# Patient Record
Sex: MALE | Race: OTHER | HISPANIC OR LATINO | URBAN - METROPOLITAN AREA
[De-identification: names, ages, dates, MRNs, and addresses within clinical notes are randomized per-mention and may not be internally consistent; named-entity substitution may affect disease eponyms.]

---

## 2018-04-08 ENCOUNTER — INPATIENT (INPATIENT)
Facility: HOSPITAL | Age: 63
LOS: 1 days | Discharge: HOME | End: 2018-04-10
Attending: INTERNAL MEDICINE

## 2018-04-08 VITALS
HEART RATE: 78 BPM | SYSTOLIC BLOOD PRESSURE: 190 MMHG | TEMPERATURE: 98 F | RESPIRATION RATE: 17 BRPM | OXYGEN SATURATION: 95 % | DIASTOLIC BLOOD PRESSURE: 102 MMHG

## 2018-04-08 LAB
ALBUMIN SERPL ELPH-MCNC: 3.8 G/DL — SIGNIFICANT CHANGE UP (ref 3.5–5.2)
ALP SERPL-CCNC: 127 U/L — HIGH (ref 30–115)
ALT FLD-CCNC: 77 U/L — HIGH (ref 0–41)
ANION GAP SERPL CALC-SCNC: 16 MMOL/L — HIGH (ref 7–14)
APPEARANCE UR: (no result)
APTT BLD: 27.3 SEC — SIGNIFICANT CHANGE UP (ref 27–39.2)
AST SERPL-CCNC: 60 U/L — HIGH (ref 0–41)
BACTERIA # UR AUTO: (no result) /HPF
BASOPHILS # BLD AUTO: 0.08 K/UL — SIGNIFICANT CHANGE UP (ref 0–0.2)
BASOPHILS NFR BLD AUTO: 0.4 % — SIGNIFICANT CHANGE UP (ref 0–1)
BILIRUB SERPL-MCNC: 0.9 MG/DL — SIGNIFICANT CHANGE UP (ref 0.2–1.2)
BILIRUB UR-MCNC: NEGATIVE — SIGNIFICANT CHANGE UP
BUN SERPL-MCNC: 26 MG/DL — HIGH (ref 10–20)
CALCIUM SERPL-MCNC: 9 MG/DL — SIGNIFICANT CHANGE UP (ref 8.5–10.1)
CHLORIDE SERPL-SCNC: 97 MMOL/L — LOW (ref 98–110)
CO2 SERPL-SCNC: 23 MMOL/L — SIGNIFICANT CHANGE UP (ref 17–32)
COLOR SPEC: YELLOW — SIGNIFICANT CHANGE UP
CREAT SERPL-MCNC: 1.8 MG/DL — HIGH (ref 0.7–1.5)
DIFF PNL FLD: (no result)
EOSINOPHIL # BLD AUTO: 0.15 K/UL — SIGNIFICANT CHANGE UP (ref 0–0.7)
EOSINOPHIL NFR BLD AUTO: 0.8 % — SIGNIFICANT CHANGE UP (ref 0–8)
EPI CELLS # UR: (no result) /HPF
GLUCOSE SERPL-MCNC: 105 MG/DL — HIGH (ref 70–99)
GLUCOSE UR QL: NEGATIVE MG/DL — SIGNIFICANT CHANGE UP
HCT VFR BLD CALC: 43.4 % — SIGNIFICANT CHANGE UP (ref 42–52)
HGB BLD-MCNC: 14.5 G/DL — SIGNIFICANT CHANGE UP (ref 14–18)
IMM GRANULOCYTES NFR BLD AUTO: 0.9 % — HIGH (ref 0.1–0.3)
INR BLD: 1.09 RATIO — SIGNIFICANT CHANGE UP (ref 0.65–1.3)
KETONES UR-MCNC: NEGATIVE — SIGNIFICANT CHANGE UP
LACTATE SERPL-SCNC: 1.2 MMOL/L — SIGNIFICANT CHANGE UP (ref 0.5–2.2)
LEUKOCYTE ESTERASE UR-ACNC: (no result)
LIDOCAIN IGE QN: 34 U/L — SIGNIFICANT CHANGE UP (ref 7–60)
LYMPHOCYTES # BLD AUTO: 14.3 % — LOW (ref 20.5–51.1)
LYMPHOCYTES # BLD AUTO: 2.73 K/UL — SIGNIFICANT CHANGE UP (ref 1.2–3.4)
MAGNESIUM SERPL-MCNC: 2.3 MG/DL — SIGNIFICANT CHANGE UP (ref 1.8–2.4)
MCHC RBC-ENTMCNC: 29.2 PG — SIGNIFICANT CHANGE UP (ref 27–31)
MCHC RBC-ENTMCNC: 33.4 G/DL — SIGNIFICANT CHANGE UP (ref 32–37)
MCV RBC AUTO: 87.5 FL — SIGNIFICANT CHANGE UP (ref 80–94)
MONOCYTES # BLD AUTO: 1.79 K/UL — HIGH (ref 0.1–0.6)
MONOCYTES NFR BLD AUTO: 9.3 % — SIGNIFICANT CHANGE UP (ref 1.7–9.3)
NEUTROPHILS # BLD AUTO: 14.23 K/UL — HIGH (ref 1.4–6.5)
NEUTROPHILS NFR BLD AUTO: 74.3 % — SIGNIFICANT CHANGE UP (ref 42.2–75.2)
NITRITE UR-MCNC: NEGATIVE — SIGNIFICANT CHANGE UP
NRBC # BLD: 0 /100 WBCS — SIGNIFICANT CHANGE UP (ref 0–0)
PH UR: 6 — SIGNIFICANT CHANGE UP (ref 5–8)
PLATELET # BLD AUTO: 267 K/UL — SIGNIFICANT CHANGE UP (ref 130–400)
POTASSIUM SERPL-MCNC: 4.2 MMOL/L — SIGNIFICANT CHANGE UP (ref 3.5–5)
POTASSIUM SERPL-SCNC: 4.2 MMOL/L — SIGNIFICANT CHANGE UP (ref 3.5–5)
PROT SERPL-MCNC: 7.6 G/DL — SIGNIFICANT CHANGE UP (ref 6–8)
PROT UR-MCNC: 30 MG/DL
PROTHROM AB SERPL-ACNC: 11.8 SEC — SIGNIFICANT CHANGE UP (ref 9.95–12.87)
RBC # BLD: 4.96 M/UL — SIGNIFICANT CHANGE UP (ref 4.7–6.1)
RBC # FLD: 13.6 % — SIGNIFICANT CHANGE UP (ref 11.5–14.5)
RBC CASTS # UR COMP ASSIST: (no result) /HPF
SODIUM SERPL-SCNC: 136 MMOL/L — SIGNIFICANT CHANGE UP (ref 135–146)
SP GR SPEC: 1.02 — SIGNIFICANT CHANGE UP (ref 1.01–1.03)
UROBILINOGEN FLD QL: 1 MG/DL (ref 0.2–0.2)
WBC # BLD: 19.15 K/UL — HIGH (ref 4.8–10.8)
WBC # FLD AUTO: 19.15 K/UL — HIGH (ref 4.8–10.8)
WBC UR QL: >50 /HPF

## 2018-04-08 RX ORDER — DIATRIZOATE MEGLUMINE 180 MG/ML
20 INJECTION, SOLUTION INTRAVESICAL ONCE
Qty: 0 | Refills: 0 | Status: COMPLETED | OUTPATIENT
Start: 2018-04-08 | End: 2018-04-08

## 2018-04-08 RX ORDER — SODIUM CHLORIDE 9 MG/ML
1000 INJECTION INTRAMUSCULAR; INTRAVENOUS; SUBCUTANEOUS ONCE
Qty: 0 | Refills: 0 | Status: COMPLETED | OUTPATIENT
Start: 2018-04-08 | End: 2018-04-08

## 2018-04-08 RX ADMIN — SODIUM CHLORIDE 1000 MILLILITER(S): 9 INJECTION INTRAMUSCULAR; INTRAVENOUS; SUBCUTANEOUS at 22:11

## 2018-04-08 RX ADMIN — DIATRIZOATE MEGLUMINE 30 MILLILITER(S): 180 INJECTION, SOLUTION INTRAVESICAL at 22:31

## 2018-04-08 NOTE — ED ADULT NURSE NOTE - OBJECTIVE STATEMENT
pt states he began having abdominal pain on Thursday. went to PMD and was given a laxative. had one large BM but pt is still reporting intermittent severe abdominal pain. occasional nausea, no vomiting. reports diaphoresis

## 2018-04-09 DIAGNOSIS — Z98.890 OTHER SPECIFIED POSTPROCEDURAL STATES: Chronic | ICD-10-CM

## 2018-04-09 DIAGNOSIS — Z95.5 PRESENCE OF CORONARY ANGIOPLASTY IMPLANT AND GRAFT: Chronic | ICD-10-CM

## 2018-04-09 DIAGNOSIS — N20.0 CALCULUS OF KIDNEY: ICD-10-CM

## 2018-04-09 DIAGNOSIS — N13.2 HYDRONEPHROSIS WITH RENAL AND URETERAL CALCULOUS OBSTRUCTION: ICD-10-CM

## 2018-04-09 LAB
ALBUMIN SERPL ELPH-MCNC: 3.1 G/DL — LOW (ref 3.5–5.2)
ALP SERPL-CCNC: 104 U/L — SIGNIFICANT CHANGE UP (ref 30–115)
ALT FLD-CCNC: 59 U/L — HIGH (ref 0–41)
ANION GAP SERPL CALC-SCNC: 14 MMOL/L — SIGNIFICANT CHANGE UP (ref 7–14)
AST SERPL-CCNC: 36 U/L — SIGNIFICANT CHANGE UP (ref 0–41)
BASOPHILS # BLD AUTO: 0.08 K/UL — SIGNIFICANT CHANGE UP (ref 0–0.2)
BASOPHILS NFR BLD AUTO: 0.6 % — SIGNIFICANT CHANGE UP (ref 0–1)
BILIRUB DIRECT SERPL-MCNC: 0.2 MG/DL — SIGNIFICANT CHANGE UP (ref 0–0.2)
BILIRUB INDIRECT FLD-MCNC: 0.6 MG/DL — SIGNIFICANT CHANGE UP (ref 0.2–1.2)
BILIRUB SERPL-MCNC: 0.8 MG/DL — SIGNIFICANT CHANGE UP (ref 0.2–1.2)
BUN SERPL-MCNC: 21 MG/DL — HIGH (ref 10–20)
CALCIUM SERPL-MCNC: 7.9 MG/DL — LOW (ref 8.5–10.1)
CHLORIDE SERPL-SCNC: 103 MMOL/L — SIGNIFICANT CHANGE UP (ref 98–110)
CO2 SERPL-SCNC: 23 MMOL/L — SIGNIFICANT CHANGE UP (ref 17–32)
CREAT SERPL-MCNC: 1.5 MG/DL — SIGNIFICANT CHANGE UP (ref 0.7–1.5)
EOSINOPHIL # BLD AUTO: 0.26 K/UL — SIGNIFICANT CHANGE UP (ref 0–0.7)
EOSINOPHIL NFR BLD AUTO: 1.9 % — SIGNIFICANT CHANGE UP (ref 0–8)
GLUCOSE SERPL-MCNC: 106 MG/DL — HIGH (ref 70–99)
HCT VFR BLD CALC: 36.7 % — LOW (ref 42–52)
HGB BLD-MCNC: 12.3 G/DL — LOW (ref 14–18)
IMM GRANULOCYTES NFR BLD AUTO: 0.7 % — HIGH (ref 0.1–0.3)
LYMPHOCYTES # BLD AUTO: 22.4 % — SIGNIFICANT CHANGE UP (ref 20.5–51.1)
LYMPHOCYTES # BLD AUTO: 3.07 K/UL — SIGNIFICANT CHANGE UP (ref 1.2–3.4)
MCHC RBC-ENTMCNC: 29.6 PG — SIGNIFICANT CHANGE UP (ref 27–31)
MCHC RBC-ENTMCNC: 33.5 G/DL — SIGNIFICANT CHANGE UP (ref 32–37)
MCV RBC AUTO: 88.2 FL — SIGNIFICANT CHANGE UP (ref 80–94)
MONOCYTES # BLD AUTO: 1.4 K/UL — HIGH (ref 0.1–0.6)
MONOCYTES NFR BLD AUTO: 10.2 % — HIGH (ref 1.7–9.3)
NEUTROPHILS # BLD AUTO: 8.83 K/UL — HIGH (ref 1.4–6.5)
NEUTROPHILS NFR BLD AUTO: 64.2 % — SIGNIFICANT CHANGE UP (ref 42.2–75.2)
PLATELET # BLD AUTO: 212 K/UL — SIGNIFICANT CHANGE UP (ref 130–400)
POTASSIUM SERPL-MCNC: 3.9 MMOL/L — SIGNIFICANT CHANGE UP (ref 3.5–5)
POTASSIUM SERPL-SCNC: 3.9 MMOL/L — SIGNIFICANT CHANGE UP (ref 3.5–5)
PROT SERPL-MCNC: 6.3 G/DL — SIGNIFICANT CHANGE UP (ref 6–8)
RBC # BLD: 4.16 M/UL — LOW (ref 4.7–6.1)
RBC # FLD: 13.5 % — SIGNIFICANT CHANGE UP (ref 11.5–14.5)
SODIUM SERPL-SCNC: 140 MMOL/L — SIGNIFICANT CHANGE UP (ref 135–146)
WBC # BLD: 13.73 K/UL — HIGH (ref 4.8–10.8)
WBC # FLD AUTO: 13.73 K/UL — HIGH (ref 4.8–10.8)

## 2018-04-09 RX ORDER — TAMSULOSIN HYDROCHLORIDE 0.4 MG/1
0.4 CAPSULE ORAL AT BEDTIME
Qty: 0 | Refills: 0 | Status: DISCONTINUED | OUTPATIENT
Start: 2018-04-09 | End: 2018-04-10

## 2018-04-09 RX ORDER — CEFTRIAXONE 500 MG/1
1 INJECTION, POWDER, FOR SOLUTION INTRAMUSCULAR; INTRAVENOUS ONCE
Qty: 0 | Refills: 0 | Status: COMPLETED | OUTPATIENT
Start: 2018-04-09 | End: 2018-04-09

## 2018-04-09 RX ORDER — SODIUM CHLORIDE 9 MG/ML
1000 INJECTION INTRAMUSCULAR; INTRAVENOUS; SUBCUTANEOUS
Qty: 0 | Refills: 0 | Status: DISCONTINUED | OUTPATIENT
Start: 2018-04-09 | End: 2018-04-10

## 2018-04-09 RX ORDER — ASPIRIN/CALCIUM CARB/MAGNESIUM 324 MG
325 TABLET ORAL DAILY
Qty: 0 | Refills: 0 | Status: DISCONTINUED | OUTPATIENT
Start: 2018-04-09 | End: 2018-04-10

## 2018-04-09 RX ORDER — ATORVASTATIN CALCIUM 80 MG/1
1 TABLET, FILM COATED ORAL
Qty: 0 | Refills: 0 | COMMUNITY

## 2018-04-09 RX ORDER — NIFEDIPINE 30 MG
60 TABLET, EXTENDED RELEASE 24 HR ORAL DAILY
Qty: 0 | Refills: 0 | Status: DISCONTINUED | OUTPATIENT
Start: 2018-04-09 | End: 2018-04-10

## 2018-04-09 RX ORDER — ATORVASTATIN CALCIUM 80 MG/1
20 TABLET, FILM COATED ORAL AT BEDTIME
Qty: 0 | Refills: 0 | Status: DISCONTINUED | OUTPATIENT
Start: 2018-04-09 | End: 2018-04-10

## 2018-04-09 RX ORDER — INFLUENZA VIRUS VACCINE 15; 15; 15; 15 UG/.5ML; UG/.5ML; UG/.5ML; UG/.5ML
0.5 SUSPENSION INTRAMUSCULAR ONCE
Qty: 0 | Refills: 0 | Status: DISCONTINUED | OUTPATIENT
Start: 2018-04-09 | End: 2018-04-10

## 2018-04-09 RX ORDER — SODIUM CHLORIDE 9 MG/ML
1000 INJECTION INTRAMUSCULAR; INTRAVENOUS; SUBCUTANEOUS ONCE
Qty: 0 | Refills: 0 | Status: COMPLETED | OUTPATIENT
Start: 2018-04-09 | End: 2018-04-09

## 2018-04-09 RX ORDER — ACETAMINOPHEN 500 MG
650 TABLET ORAL EVERY 6 HOURS
Qty: 0 | Refills: 0 | Status: DISCONTINUED | OUTPATIENT
Start: 2018-04-09 | End: 2018-04-10

## 2018-04-09 RX ORDER — ASPIRIN/CALCIUM CARB/MAGNESIUM 324 MG
1 TABLET ORAL
Qty: 0 | Refills: 0 | COMMUNITY

## 2018-04-09 RX ORDER — MORPHINE SULFATE 50 MG/1
1 CAPSULE, EXTENDED RELEASE ORAL EVERY 6 HOURS
Qty: 0 | Refills: 0 | Status: DISCONTINUED | OUTPATIENT
Start: 2018-04-09 | End: 2018-04-10

## 2018-04-09 RX ORDER — CEFTRIAXONE 500 MG/1
1 INJECTION, POWDER, FOR SOLUTION INTRAMUSCULAR; INTRAVENOUS EVERY 24 HOURS
Qty: 0 | Refills: 0 | Status: DISCONTINUED | OUTPATIENT
Start: 2018-04-09 | End: 2018-04-09

## 2018-04-09 RX ORDER — KETOROLAC TROMETHAMINE 30 MG/ML
15 SYRINGE (ML) INJECTION ONCE
Qty: 0 | Refills: 0 | Status: DISCONTINUED | OUTPATIENT
Start: 2018-04-09 | End: 2018-04-09

## 2018-04-09 RX ORDER — TAMSULOSIN HYDROCHLORIDE 0.4 MG/1
0.4 CAPSULE ORAL ONCE
Qty: 0 | Refills: 0 | Status: COMPLETED | OUTPATIENT
Start: 2018-04-09 | End: 2018-04-09

## 2018-04-09 RX ORDER — METOPROLOL TARTRATE 50 MG
1 TABLET ORAL
Qty: 0 | Refills: 0 | COMMUNITY

## 2018-04-09 RX ORDER — NIFEDIPINE 30 MG
1 TABLET, EXTENDED RELEASE 24 HR ORAL
Qty: 0 | Refills: 0 | COMMUNITY

## 2018-04-09 RX ORDER — MORPHINE SULFATE 50 MG/1
2 CAPSULE, EXTENDED RELEASE ORAL EVERY 4 HOURS
Qty: 0 | Refills: 0 | Status: DISCONTINUED | OUTPATIENT
Start: 2018-04-09 | End: 2018-04-10

## 2018-04-09 RX ORDER — CEFTRIAXONE 500 MG/1
2 INJECTION, POWDER, FOR SOLUTION INTRAMUSCULAR; INTRAVENOUS EVERY 24 HOURS
Qty: 0 | Refills: 0 | Status: DISCONTINUED | OUTPATIENT
Start: 2018-04-09 | End: 2018-04-10

## 2018-04-09 RX ORDER — ASPIRIN/CALCIUM CARB/MAGNESIUM 324 MG
81 TABLET ORAL DAILY
Qty: 0 | Refills: 0 | Status: DISCONTINUED | OUTPATIENT
Start: 2018-04-09 | End: 2018-04-09

## 2018-04-09 RX ORDER — HEPARIN SODIUM 5000 [USP'U]/ML
5000 INJECTION INTRAVENOUS; SUBCUTANEOUS EVERY 8 HOURS
Qty: 0 | Refills: 0 | Status: DISCONTINUED | OUTPATIENT
Start: 2018-04-09 | End: 2018-04-10

## 2018-04-09 RX ORDER — METOPROLOL TARTRATE 50 MG
50 TABLET ORAL DAILY
Qty: 0 | Refills: 0 | Status: DISCONTINUED | OUTPATIENT
Start: 2018-04-09 | End: 2018-04-10

## 2018-04-09 RX ADMIN — HEPARIN SODIUM 5000 UNIT(S): 5000 INJECTION INTRAVENOUS; SUBCUTANEOUS at 14:29

## 2018-04-09 RX ADMIN — CEFTRIAXONE 100 GRAM(S): 500 INJECTION, POWDER, FOR SOLUTION INTRAMUSCULAR; INTRAVENOUS at 02:27

## 2018-04-09 RX ADMIN — TAMSULOSIN HYDROCHLORIDE 0.4 MILLIGRAM(S): 0.4 CAPSULE ORAL at 21:28

## 2018-04-09 RX ADMIN — Medication 50 MILLIGRAM(S): at 05:34

## 2018-04-09 RX ADMIN — Medication 60 MILLIGRAM(S): at 10:59

## 2018-04-09 RX ADMIN — SODIUM CHLORIDE 100 MILLILITER(S): 9 INJECTION INTRAMUSCULAR; INTRAVENOUS; SUBCUTANEOUS at 21:27

## 2018-04-09 RX ADMIN — Medication 325 MILLIGRAM(S): at 14:28

## 2018-04-09 RX ADMIN — SODIUM CHLORIDE 100 MILLILITER(S): 9 INJECTION INTRAMUSCULAR; INTRAVENOUS; SUBCUTANEOUS at 05:35

## 2018-04-09 RX ADMIN — Medication 60 MILLIGRAM(S): at 05:34

## 2018-04-09 RX ADMIN — CEFTRIAXONE 100 GRAM(S): 500 INJECTION, POWDER, FOR SOLUTION INTRAMUSCULAR; INTRAVENOUS at 14:29

## 2018-04-09 RX ADMIN — HEPARIN SODIUM 5000 UNIT(S): 5000 INJECTION INTRAVENOUS; SUBCUTANEOUS at 21:28

## 2018-04-09 RX ADMIN — TAMSULOSIN HYDROCHLORIDE 0.4 MILLIGRAM(S): 0.4 CAPSULE ORAL at 01:25

## 2018-04-09 RX ADMIN — Medication 15 MILLIGRAM(S): at 02:27

## 2018-04-09 RX ADMIN — SODIUM CHLORIDE 1000 MILLILITER(S): 9 INJECTION INTRAMUSCULAR; INTRAVENOUS; SUBCUTANEOUS at 02:27

## 2018-04-09 RX ADMIN — ATORVASTATIN CALCIUM 20 MILLIGRAM(S): 80 TABLET, FILM COATED ORAL at 21:28

## 2018-04-09 NOTE — H&P ADULT - HISTORY OF PRESENT ILLNESS
62y old  Male w/ pmhx of nephrolithiasis s/p lithotripsy, CAD s/p PCI and stent (13 years ago), who presents with a chief complaint of abdominal pain radiating to left lower quadrant which began on thursday.  Pt saw PMD and was treated for constipation.  Pt symptoms persisted after bowel mvmt and now associated with nausea and chills. Pt denies fever or dysuria. 62y old  Male w/ pmhx of nephrolithiasis s/p lithotripsy, CAD s/p PCI and stent (13 years ago), HTN, DLD presents with worsening L sided pelvic pain X 4 days. Pt reports that pain began in the left CVA area, then moved to the left pelvic area.  Pain was described as a shooting pain, comes and goes, exacerbated by laying on the left side. Pt reports subjective fever, chills, nausea and 3-4 episodes of non bloody non bilious vomiting. Also, pt admits to stop taking his medications because pt could not tolerate po intake. Pt saw PMD and was treated for constipation.  Pt's symptoms persisted after bowel mvmt and decided to come to the hospital. No dysuria, frequency, or hematuria.    In ED, pt is s/p rocephin 1g, IVF NS 2 L bolus, toradol 15mg. 62y old  Male w/ pmhx of nephrolithiasis s/p lithotripsy, CAD s/p PCI and stent (13 years ago), HTN, DLD presents with worsening L sided pelvic pain X 4 days. Pt reports that pain began in the left CVA area, then moved to the left pelvic area.  Pain was described as a shooting pain, comes and goes, exacerbated by laying on the left side. Pt reports subjective fever, chills, nausea and 3-4 episodes of non bloody non bilious vomiting. Also, pt stopped taking his medications 2/2 intolerance to PO intake. Pt saw PMD and was treated for constipation.  Pt's symptoms persisted after bowel mvmt and decided to come to the hospital. Mild pain on urination. No hematuria.    In ED, pt is s/p rocephin 1g, IVF NS 2 L bolus, toradol 15mg.

## 2018-04-09 NOTE — H&P ADULT - NSHPPHYSICALEXAM_GEN_ALL_CORE
GENERAL: NAD, well-developed. non toxic appearing  HEAD:  Atraumatic, Normocephalic  EYES: EOMI, PERRLA, conjunctiva and sclera clear  NECK: Supple, No JVD  CHEST/LUNG: Clear to auscultation bilaterally; No wheeze  HEART: Regular rate and rhythm; No murmurs, rubs, or gallops  ABDOMEN: Soft, + mild left pelvic tenderness. Nondistended; Bowel sounds present. No guarding  EXTREMITIES:  no edema  NEUROLOGY: non-focal, AAOx3

## 2018-04-09 NOTE — CONSULT NOTE ADULT - ATTENDING COMMENTS
Patient with 4mm left UVJ stone as seen on CT scan -- HU about 200  KUB wont show such a soft stone  sonogram shows possible residual left hydronpehrosis, bilateral ureteral jets  patient states that pain has improved but remains  plan to reassess in AM to determine if need for left ureteral stent by Dr Tineo

## 2018-04-09 NOTE — ED PROVIDER NOTE - PLAN OF CARE
62m w LLQ abdominal pain/tender, nontoxic appearing, n/v intact. --CBC, CMP, coags, lipase, UA, CT abd. --IV fluids, analgesia as needed, observe/re-assess.

## 2018-04-09 NOTE — CONSULT NOTE ADULT - SUBJECTIVE AND OBJECTIVE BOX
Patient is a 62y old  Male who presents with a chief complaint of abdominal pain radiating to left lower quadrant which began on thursday.  Pt saw PMD and was treated for constipation.  Pt symptoms persisted after bowel mvmt and now associated with nausea and chills. Pt denies fever or dysuria.           PAST MEDICAL & SURGICAL HISTORY:  Hyperlipemia  HTN (hypertension)  CAD  MI  Cardiac stent  Achilles tendon repair  Nephrolithiasis      REVIEW OF SYSTEMS:    CONSTITUTIONAL: + chills  HEENT: No visual changes  ENDO: No sweating  NECK: No pain or stiffness  MUSCULOSKELETAL: No back pain, no joint pain  RESPIRATORY: No shortness of breath  CARDIOVASCULAR: No chest pain  GASTROINTESTINAL: No abdominal or epigastric pain. No nausea, vomiting,  No diarrhea or constipation.   NEUROLOGICAL: No mental status changes  PSYCH: No depression, no mood changes  SKIN: No itching      MEDICATIONS  (STANDING):  cefTRIAXone   IVPB 1 Gram(s) IV Intermittent Once  ketorolac   Injectable 15 milliGRAM(s) IV Push Once  sodium chloride 0.9% Bolus 1000 milliLiter(s) IV Bolus once    MEDICATIONS  (PRN):      Allergies    Allergy Status Unknown    Intolerances        SOCIAL HISTORY: No illicit drug use    FAMILY HISTORY:      Vital Signs Last 24 Hrs  T(C): 36.4 (2018 20:22), Max: 36.4 (2018 20:22)  T(F): 97.6 (2018 20:22), Max: 97.6 (2018 20:22)  HR: 78 (2018 20:22) (78 - 78)  BP: 190/102 (2018 20:22) (190/102 - 190/102)  BP(mean): --  RR: 17 (2018 20:22) (17 - 17)  SpO2: 95% (2018 20:22) (95% - 95%)    PHYSICAL EXAM:    Constitutional: NAD, well-developed  HEENT: EOMI  Neck: no pain  Back: Mild Left sided CVA tenderness  Respiratory: No accessory respiratory muscle use  Abd: Soft, NT/ND  no organomegally  no hernia  : no testicular tenderness, no supraubic tenderness  Extremities: no edema  Neurological: A/O x 3  Psychiatric: Normal mood, normal affect  Skin: No rashes    I&O's Summary      LABS:                        14.5   19.15 )-----------( 267      ( 2018 22:10 )             43.4     04-08    136  |  97<L>  |  26<H>  ----------------------------<  105<H>  4.2   |  23  |  1.8<H>    Ca    9.0      2018 22:10  Mg     2.3         TPro  7.6  /  Alb  3.8  /  TBili  0.9  /  DBili  x   /  AST  60<H>  /  ALT  77<H>  /  AlkPhos  127<H>      PT/INR - ( 2018 22:10 )   PT: 11.80 sec;   INR: 1.09 ratio         PTT - ( 2018 22:10 )  PTT:27.3 sec  Urinalysis Basic - ( 2018 22:10 )    Color: Yellow / Appearance: Cloudy / S.020 / pH: x  Gluc: x / Ketone: Negative  / Bili: Negative / Urobili: 1.0 mg/dL   Blood: x / Protein: 30 mg/dL / Nitrite: Negative   Leuk Esterase: Moderate / RBC: 10-25 /HPF / WBC >50 /HPF   Sq Epi: x / Non Sq Epi: Few /HPF / Bacteria: Moderate /HPF      Urine Culture:         RADIOLOGY & ADDITIONAL STUDIES:  < from: CT Abdomen and Pelvis w/ Oral Cont (18 @ 00:59) >    IMPRESSION:        Mild left hydroureteronephrosis extending to an obstructing calculus at   the UVJ measuring 4 x 4 x 3 mm.    < end of copied text >

## 2018-04-09 NOTE — H&P ADULT - NSHPREVIEWOFSYSTEMS_GEN_ALL_CORE
Review of Systems:   CONSTITUTIONAL: + subjective fever, chills, loss of appetite  EYES: No eye pain, visual disturbances, or discharge  ENMT:  No difficulty hearing, tinnitus, vertigo; No sinus or throat pain  NECK: No pain or stiffness  RESPIRATORY: No cough, wheezing, chills or hemoptysis; No shortness of breath  CARDIOVASCULAR: No chest pain, palpitations, dizziness, or leg swelling  GASTROINTESTINAL: + L pelvic area, +N/V. no diarrhea.   GENITOURINARY: No dysuria, frequency, hematuria, or incontinence  NEUROLOGICAL: No headaches Review of Systems:   CONSTITUTIONAL: + subjective fever, chills, loss of appetite  EYES: No eye pain, visual disturbances, or discharge  ENMT:  No difficulty hearing, tinnitus, vertigo; No sinus or throat pain  NECK: No pain or stiffness  RESPIRATORY: No cough, wheezing, chills or hemoptysis; No shortness of breath  CARDIOVASCULAR: No chest pain, palpitations, dizziness, or leg swelling  GASTROINTESTINAL: + L pelvic area, +N/V. no diarrhea.   GENITOURINARY: mild pain on urination. No hematuria  NEUROLOGICAL: No headaches

## 2018-04-09 NOTE — ED PROVIDER NOTE - PMH
HTN (hypertension)    Hyperlipemia Coronary artery disease    HTN (hypertension)    Hyperlipemia    Nephrolithiasis  s/p lithotripsy  Stented coronary artery  stent placement 13 years ago

## 2018-04-09 NOTE — H&P ADULT - ASSESSMENT
63 yo M with PMHx of HTN, DLD, CAD s/p stent, nephrolithiasis s/p lithrotripsy presents with left sided pelvic pain a/w subjective fever, chills, N/V for 4 days.    # UTI and obstructive left nephrolithiasis   - c/w Urine culture and blood culture  - f/u urology  - c/w Flomax 0.4mg qD  - pain control: morphine 2mg q6 prn for severe pain, morphine 1mg q6 prn for moderate pain  - c/w  cc/ hr  - advance diet as tolerated    # KAE 2/2 obstructive nephrolithiasis   - c/w IVF     # HTN - BP elevated likely 2/2 pt not taking his medications and pain  - resume home meds ( nifedipine XL 60mg )  - pain control  - monitor BP    # DLD  - lipitor 20mg     # CAD s/p stent  - ASA, Toprol, lipitor     # dvt ppx: heparin subq  - no need for GI ppx for now 61 yo M with PMHx of HTN, DLD, CAD s/p stent, nephrolithiasis s/p lithrotripsy presents with left sided pelvic pain a/w subjective fever, chills, N/V for 4 days.    # UTI and obstructive left nephrolithiasis   - c/w Urine culture and blood culture  - IV Rocephin 1G qD  - f/u urology  - c/w Flomax 0.4mg qD  - pain control: morphine 2mg q6 prn for severe pain, morphine 1mg q6 prn for moderate pain  - c/w  cc/ hr  - advance diet as tolerated    # KAE 2/2 obstructive nephrolithiasis   - c/w IVF     # HTN - BP elevated likely 2/2 pt not taking his medications and pain  - resume home meds ( nifedipine XL 60mg )  - pain control  - monitor BP    # DLD  - lipitor 20mg     # CAD s/p stent  - ASA, Toprol, lipitor     # dvt ppx: heparin subq  - no need for GI ppx for now 63 yo M with PMHx of HTN, DLD, CAD s/p stent, nephrolithiasis s/p lithrotripsy presents with left sided pelvic pain a/w subjective fever, chills, N/V for 4 days.    # Pyelonephritis and obstructive left nephrolithiasis   - c/w Urine culture and blood culture  - IV Rocephin 2 gram qD  - f/u urology  - consider ID consult  - c/w Flomax 0.4mg qD  - pain control: morphine 2mg q6 prn for severe pain, morphine 1mg q6 prn for moderate pain  - c/w  cc/ hr  - advance diet as tolerated    # KAE likely 2/2 obstructive nephrolithiasis   - c/w IVF     # mild transaminitis   - monitor CMP    # HTN - BP elevated likely 2/2 pt not taking medications and pain  - resume home meds ( nifedipine XL 60mg )  - pain control  - monitor BP    # DLD  - lipitor 20mg     # CAD s/p stent  - ASA, Toprol, lipitor     # dvt ppx: heparin subq  - no need for GI ppx for now 63 yo M with PMHx of HTN, DLD, CAD s/p stent, nephrolithiasis s/p lithrotripsy presents with left sided pelvic pain a/w subjective fever, chills, N/V for 4 days.    # Pyelonephritis and left obstructing calculus at UVJ    - c/w Urine culture and blood culture  - IV Rocephin 2 gram qD  - f/u urology : if pt does not improve, need med/cardio clearance for cystoscopy and stent.   - consider ID consult  - c/w Flomax 0.4mg qD  - pain control: morphine 2mg q6 prn for severe pain, morphine 1mg q6 prn for moderate pain  - c/w  cc/ hr  - advance diet as tolerated    # KAE likely 2/2 obstructive ureteral stone  - c/w IVF   - Cr in 02/2006: 1.1    # mild transaminitis   - monitor CMP    # HTN - BP elevated likely 2/2 pt not taking medications and pain  - resume home meds ( nifedipine XL 60mg )  - pain control  - monitor BP    # DLD  - lipitor 20mg     # CAD s/p stent  - ASA, Toprol, lipitor     # dvt ppx: heparin subq  - no need for GI ppx for now 61 yo M with PMHx of HTN, DLD, CAD s/p stent, nephrolithiasis s/p lithrotripsy presents with left sided pelvic pain a/w subjective fever, chills, N/V for 4 days.    # Pyelonephritis and left obstructing calculus at UVJ    - c/w Urine culture and blood culture  - IV Rocephin 2 gram qD  - f/u urology : if pt does not improve, need med/cardio clearance for cystoscopy and stent.   - consider ID consult  - c/w Flomax 0.4mg qD  - pain control: morphine 2mg q6 prn for severe pain, morphine 1mg q6 prn for moderate pain  - c/w  cc/ hr  - advance diet as tolerated    # KAE likely 2/2 obstructive ureteral stone  - c/w IVF   - Cr in 02/2006: 1.1    # mild transaminitis   - monitor CMP    # HTN - BP elevated likely 2/2 pt not taking medications and pain  - resume home meds ( nifedipine XL 60mg )  - pain control  - monitor BP    # DLD  - lipitor 20mg     # CAD s/p stent  - ASA 325mg, Toprol, lipitor     # dvt ppx: heparin subq  - no need for GI ppx for now

## 2018-04-09 NOTE — H&P ADULT - ATTENDING COMMENTS
Patient is seen and examined independently, I agree with resident note above and plan of care -edited and corrected where applicable- with addition:    PAST MEDICAL & SURGICAL HISTORY:  Coronary artery disease  Nephrolithiasis: s/p lithotripsy  Stented coronary artery: stent placement 13 years ago  Hyperlipemia  HTN (hypertension)  H/O heart artery stent  H/O Achilles tendon repair: Right side      Vitals:  T(F): 97 (18 @ 09:46)  HR: 83 (18 @ 09:46)  BP: 175/91 (18 @ 09:46)  RR: 18 (18 @ 08:03)  SpO2: 96% (18 @ 08:03)    TESTS & MEASUREMENTS:                        12.3   13.73 )-----------( 212      ( 2018 06:32 )             36.7     PT/INR - ( 2018 22:10 )   PT: 11.80 sec;   INR: 1.09 ratio         PTT - ( 2018 22:10 )  PTT:27.3 sec      140  |  103  |  21<H>  ----------------------------<  106<H>  3.9   |  23  |  1.5    Ca    7.9<L>      2018 06:32  Mg     2.3         TPro  6.3  /  Alb  3.1<L>  /  TBili  0.8  /  DBili  0.2  /  AST  36  /  ALT  59<H>  /  AlkPhos  104  09    LIVER FUNCTIONS - ( 2018 06:32 )  Alb: 3.1 g/dL / Pro: 6.3 g/dL / ALK PHOS: 104 U/L / ALT: 59 U/L / AST: 36 U/L / GGT: x           Urinalysis Basic - ( 2018 22:10 )    Color: Yellow / Appearance: Cloudy / S.020 / pH: x  Gluc: x / Ketone: Negative  / Bili: Negative / Urobili: 1.0 mg/dL   Blood: x / Protein: 30 mg/dL / Nitrite: Negative   Leuk Esterase: Moderate / RBC: 10-25 /HPF / WBC >50 /HPF   Sq Epi: x / Non Sq Epi: Few /HPF / Bacteria: Moderate /HPF      In summary:  HEALTH ISSUES - PROBLEM Dx:  Hydronephrosis with urinary obstruction due to ureteral calculus: Hydronephrosis with urinary obstruction due to ureteral calculus  Nephrolithiasis: Nephrolithiasis Patient is seen and examined independently, I agree with resident note above and plan of care -edited and corrected where applicable- with addition:    PAST MEDICAL & SURGICAL HISTORY:  Coronary artery disease  Nephrolithiasis: s/p lithotripsy  Stented coronary artery: stent placement 13 years ago  Hyperlipemia  HTN (hypertension)  H/O heart artery stent  H/O Achilles tendon repair: Right side      Vitals:  T(F): 97 (18 @ 09:46)  HR: 83 (18 @ 09:46)  BP: 175/91 (18 @ 09:46)  RR: 18 (18 @ 08:03)  SpO2: 96% (18 @ 08:03)    TESTS & MEASUREMENTS:                        12.3   13.73 )-----------( 212      ( 2018 06:32 )             36.7     PT/INR - ( 2018 22:10 )   PT: 11.80 sec;   INR: 1.09 ratio         PTT - ( 2018 22:10 )  PTT:27.3 sec      140  |  103  |  21<H>  ----------------------------<  106<H>  3.9   |  23  |  1.5    Ca    7.9<L>      2018 06:32  Mg     2.3         TPro  6.3  /  Alb  3.1<L>  /  TBili  0.8  /  DBili  0.2  /  AST  36  /  ALT  59<H>  /  AlkPhos  104  09    LIVER FUNCTIONS - ( 2018 06:32 )  Alb: 3.1 g/dL / Pro: 6.3 g/dL / ALK PHOS: 104 U/L / ALT: 59 U/L / AST: 36 U/L / GGT: x           Urinalysis Basic - ( 2018 22:10 )    Color: Yellow / Appearance: Cloudy / S.020 / pH: x  Gluc: x / Ketone: Negative  / Bili: Negative / Urobili: 1.0 mg/dL   Blood: x / Protein: 30 mg/dL / Nitrite: Negative   Leuk Esterase: Moderate / RBC: 10-25 /HPF / WBC >50 /HPF   Sq Epi: x / Non Sq Epi: Few /HPF / Bacteria: Moderate /HPF      In summary:  HEALTH ISSUES - PROBLEM Dx:  Hydronephrosis with urinary obstruction due to ureteral calculus: being closely f/u by urology team.  Recovering KAE, obstructive uropathy  Pyelonephritis on BS IV Abx    Plan:  . BS IV Abx until UCx results available  . IVF/Flomax/Analgesics/ Urology F/U possible need for JJ/Cystoscopy if no improvement  . I/O BUN/Crea

## 2018-04-09 NOTE — CONSULT NOTE ADULT - PROBLEM SELECTOR RECOMMENDATION 9
Admit to medical service, cardiac evaluation, IV hydration, IV antibiotics, Toradol for pain and Flomax. Strain all urine. If pt does not improve may need cystoscopy with with ureteral stent. Will need medical and/or cardiology clearance prior to any procedure. Dr Lowry aware and agrees with above plan

## 2018-04-09 NOTE — H&P ADULT - FAMILY HISTORY
Father  Still living? Unknown  Family history of nephrolithiasis, Age at diagnosis: Age Unknown  Family history of coronary artery disease, Age at diagnosis: Age Unknown

## 2018-04-09 NOTE — PROGRESS NOTE ADULT - SUBJECTIVE AND OBJECTIVE BOX
Unable to take pt to OR 4/9 as add-on case.   Pain has been controlled throughout the day.   No fever. Minimal CVAT.  WBC 13 from 20, Cr 1.5 from 1.8.  May feed patient tonight. NPO after midnight for possible add-on tomorrow 4/10 for stent placement if warranted. Will get KUB & R/B sono.    D/w  attending.

## 2018-04-09 NOTE — ED PROVIDER NOTE - PROGRESS NOTE DETAILS
urology consult aware. patient comfortable at this time case d/w Urology and recommending admit to medicine, IV fluids, abx, & toradol. case d/w Dr Monica MENDEZ & Dr Baeza for admit

## 2018-04-09 NOTE — H&P ADULT - NSHPLABSRESULTS_GEN_ALL_CORE
T(C): 36.4 (18 @ 20:22), Max: 36.4 (18 @ 20:22)  T(F): 97.6 (18 @ 20:22), Max: 97.6 (18 @ 20:22)  HR: 78 (18 @ 20:22) (78 - 78)  BP: 190/102 (18 @ 20:22) (190/102 - 190/102)  RR: 17 (18 @ 20:22) (17 - 17)  SpO2: 95% (18 @ 20:22) (95% - 95%)  Labs:                               14.5   19.15<H>   )-----------(   267      ( 2018 22:10 )                    43.4     Neutro%  74.3    Lympho%  14.3<L>   Mono%    9.3     Bands    x            136  |  97<L>  |  26<H>  ----------------------------<  105<H>  4.2   |  23  |  1.8<H>    Ca    9.0      2018 22:10  Mg     2.3         TPro  7.6  /  Alb  3.8  /  TBili  0.9  /  DBili  x   /  AST  60<H>  /  ALT  77<H>  /  AlkPhos  127<H>      eGFR if Non African American: 39 mL/min/1.73M2 (18 @ 22:10)  eGFR if African American: 45 mL/min/1.73M2 (18 @ 22:10)      ( 2018 22:10 )   PT: 11.80 sec;   INR: 1.09 ratio;       PTT:27.3 sec    Urinalysis Basic - ( 2018 22:10 )    Color: Yellow / Appearance: Cloudy / S.020 / pH: x  Gluc: x / Ketone: Negative  / Bili: Negative / Urobili: 1.0 mg/dL   Blood: x / Protein: 30 mg/dL / Nitrite: Negative   Leuk Esterase: Moderate / RBC: 10-25 /HPF / WBC >50 /HPF   Sq Epi: x / Non Sq Epi: Few /HPF / Bacteria: Moderate /HPF

## 2018-04-09 NOTE — ED PROVIDER NOTE - NS ED ROS FT
Review of Systems  Constitutional:  No fever or chills.  Eyes:  Negative.  ENMT:  No nasal congestion, discharge, or throat pain.   Cardiac:  No chest pain, syncope, or edema.  Respiratory:  No dyspnea, wheezing, or cough. No hemoptysis.  GI:  No vomiting, diarrhea, melena, or hematochezia. +abdominal pain  :  No dysuria or hematuria. No testicle pain/swelling, no penile discharge.  Musculoskeletal:  No joint swelling, joint pain, or back pain.  Skin:  No skin rash, pruritis, jaundice, or lesions.  Neuro:  No headache, loss of sensation, or focal weakness.  No change in mental status.

## 2018-04-09 NOTE — ED PROVIDER NOTE - PHYSICAL EXAMINATION
Physical Exam  General: Awake, alert, NAD, WDWN, non-toxic appearing, NCAT  Eyes: PERRL, EOMI, no icterus, lids and conjunctivae are normal  ENT: External inspection normal, pink/moist membranes  CV: S1S2, regular rate and rhythm, no murmur/gallops/rubs, no JVD, 2+ pulses b/l, no edema/cords/homans, warm/well-perfused  Respiratory: Normal respiratory rate/effort, no respiratory distress, normal voice, speaking full sentences, lungs clear to auscultation b/l, no wheezing/rales/rhonchi, no retractions, no stridor  Abdomen: Soft abdomen, LLQ tender, no distended/guarding/rebound, no CVA tender  Muskuloskeletal: FROM all 4 extremities, N/V intact  Neck: FROM neck, supple, no meningismus, trachea midline, no JVD  Integumentary: Color normal for race, warm and dry, no rash  Neuro: Oriented x3, CN 2-12 grossly intact, normal motor, normal sensory  Psych: Oriented x3, mood normal, affect normal Physical Exam  General: Awake, alert, NAD, WDWN, non-toxic appearing, NCAT  Eyes: PERRL, EOMI, no icterus, lids and conjunctivae are normal  ENT: External inspection normal, pink/moist membranes  CV: S1S2, regular rate and rhythm, no murmur/gallops/rubs, no JVD, 2+ pulses b/l, no edema/cords/homans, warm/well-perfused  Respiratory: Normal respiratory rate/effort, no respiratory distress, normal voice, speaking full sentences, lungs clear to auscultation b/l, no wheezing/rales/rhonchi, no retractions, no stridor  Abdomen: Soft abdomen, LLQ tender, no distended/guarding/rebound, no CVA tender  Musculoskeletal: FROM all 4 extremities, N/V intact  Neck: FROM neck, supple, no meningismus, trachea midline, no JVD  Integumentary: Color normal for race, warm and dry, no rash  Neuro: Oriented x3, CN 2-12 grossly intact, normal motor, normal sensory  Psych: Oriented x3, mood normal, affect normal

## 2018-04-09 NOTE — ED PROVIDER NOTE - MEDICAL DECISION MAKING DETAILS
62m w LLQ abdominal pain/tender and found to have ureteral stone, UTI, & KAE. Case d/w Urology. Abx, IV fluids, & analgesia given. Patient admitted for further care and management.

## 2018-04-09 NOTE — ED PROVIDER NOTE - CARE PLAN
Principal Discharge DX:	Renal colic on left side  Secondary Diagnosis:	UTI (urinary tract infection)  Secondary Diagnosis:	Acute kidney injury Principal Discharge DX:	Renal colic on left side  Assessment and plan of treatment:	62m w LLQ abdominal pain/tender, nontoxic appearing, n/v intact. --CBC, CMP, coags, lipase, UA, CT abd. --IV fluids, analgesia as needed, observe/re-assess.  Secondary Diagnosis:	UTI (urinary tract infection)  Secondary Diagnosis:	Acute kidney injury

## 2018-04-09 NOTE — ED PROVIDER NOTE - OBJECTIVE STATEMENT
62m w HTN, HLD, CAD, & prior kidney stones presents w LLQ abdominal pain since Friday. Pain is sharp, moderate, constant, no radiating, no exacerbating/alleviating. Pt reports feeling constipated since Fri as well but normal flatus. +Vomiting x3 (no blood/bile). No prior abdominal surgery.

## 2018-04-09 NOTE — H&P ADULT - PMH
HTN (hypertension)    Hyperlipemia    Nephrolithiasis  s/p lithotripsy  Stented coronary artery  stent placement 13 years ago

## 2018-04-10 ENCOUNTER — TRANSCRIPTION ENCOUNTER (OUTPATIENT)
Age: 63
End: 2018-04-10

## 2018-04-10 VITALS
SYSTOLIC BLOOD PRESSURE: 148 MMHG | DIASTOLIC BLOOD PRESSURE: 72 MMHG | RESPIRATION RATE: 18 BRPM | HEART RATE: 77 BPM | TEMPERATURE: 98 F

## 2018-04-10 LAB
ANION GAP SERPL CALC-SCNC: 14 MMOL/L — SIGNIFICANT CHANGE UP (ref 7–14)
BUN SERPL-MCNC: 17 MG/DL — SIGNIFICANT CHANGE UP (ref 10–20)
CALCIUM SERPL-MCNC: 8.2 MG/DL — LOW (ref 8.5–10.1)
CHLORIDE SERPL-SCNC: 103 MMOL/L — SIGNIFICANT CHANGE UP (ref 98–110)
CO2 SERPL-SCNC: 25 MMOL/L — SIGNIFICANT CHANGE UP (ref 17–32)
CREAT SERPL-MCNC: 1.3 MG/DL — SIGNIFICANT CHANGE UP (ref 0.7–1.5)
CULTURE RESULTS: NO GROWTH — SIGNIFICANT CHANGE UP
GLUCOSE SERPL-MCNC: 108 MG/DL — HIGH (ref 70–99)
HCT VFR BLD CALC: 36.2 % — LOW (ref 42–52)
HGB BLD-MCNC: 12 G/DL — LOW (ref 14–18)
MAGNESIUM SERPL-MCNC: 1.7 MG/DL — LOW (ref 1.8–2.4)
MCHC RBC-ENTMCNC: 29.2 PG — SIGNIFICANT CHANGE UP (ref 27–31)
MCHC RBC-ENTMCNC: 33.1 G/DL — SIGNIFICANT CHANGE UP (ref 32–37)
MCV RBC AUTO: 88.1 FL — SIGNIFICANT CHANGE UP (ref 80–94)
NRBC # BLD: 0 /100 WBCS — SIGNIFICANT CHANGE UP (ref 0–0)
PLATELET # BLD AUTO: 229 K/UL — SIGNIFICANT CHANGE UP (ref 130–400)
POTASSIUM SERPL-MCNC: 4 MMOL/L — SIGNIFICANT CHANGE UP (ref 3.5–5)
POTASSIUM SERPL-SCNC: 4 MMOL/L — SIGNIFICANT CHANGE UP (ref 3.5–5)
RBC # BLD: 4.11 M/UL — LOW (ref 4.7–6.1)
RBC # FLD: 13.5 % — SIGNIFICANT CHANGE UP (ref 11.5–14.5)
SODIUM SERPL-SCNC: 142 MMOL/L — SIGNIFICANT CHANGE UP (ref 135–146)
SPECIMEN SOURCE: SIGNIFICANT CHANGE UP
WBC # BLD: 13.15 K/UL — HIGH (ref 4.8–10.8)
WBC # FLD AUTO: 13.15 K/UL — HIGH (ref 4.8–10.8)

## 2018-04-10 RX ORDER — TAMSULOSIN HYDROCHLORIDE 0.4 MG/1
1 CAPSULE ORAL
Qty: 30 | Refills: 0 | OUTPATIENT
Start: 2018-04-10

## 2018-04-10 RX ORDER — CEFUROXIME AXETIL 250 MG
1 TABLET ORAL
Qty: 14 | Refills: 0 | OUTPATIENT
Start: 2018-04-10

## 2018-04-10 RX ADMIN — Medication 50 MILLIGRAM(S): at 05:34

## 2018-04-10 RX ADMIN — Medication 60 MILLIGRAM(S): at 05:34

## 2018-04-10 RX ADMIN — HEPARIN SODIUM 5000 UNIT(S): 5000 INJECTION INTRAVENOUS; SUBCUTANEOUS at 05:34

## 2018-04-10 RX ADMIN — Medication 325 MILLIGRAM(S): at 11:11

## 2018-04-10 RX ADMIN — CEFTRIAXONE 100 GRAM(S): 500 INJECTION, POWDER, FOR SOLUTION INTRAMUSCULAR; INTRAVENOUS at 13:47

## 2018-04-10 NOTE — PROGRESS NOTE ADULT - ATTENDING COMMENTS
pt seen examined and films reviewed he is comfortable with bilateral jets and it is likely the stone passed but he was not straining his urine. he mckenzie lgo home, strain for a few days and then  follow up . we will see if a workup is needed then

## 2018-04-10 NOTE — PROGRESS NOTE ADULT - SUBJECTIVE AND OBJECTIVE BOX
63 yo male with left uvj calculus  no events noted over night  pt 63 yo male with left uvj calculus  no events noted over night  pt denies pain or LUTS, no n/v/f/c    pt seen and examined at bedside    Vital Signs Last 24 Hrs  T(C): 36.9 (10 Apr 2018 07:34), Max: 37.1 (09 Apr 2018 15:56)  T(F): 98.5 (10 Apr 2018 07:34), Max: 98.7 (09 Apr 2018 15:56)  HR: 77 (10 Apr 2018 07:34) (77 - 85)  BP: 148/72 (10 Apr 2018 07:34) (136/65 - 169/85)  RR: 18 (10 Apr 2018 07:34) (16 - 18)  SpO2: 96% (10 Apr 2018 04:55) (96% - 96%)    renal/bladder sonogram - no hydronephrosis, no stone seen at uvj/bladder  kub- prelim- no visible stone    abd - soft, nd, nttp, no guarding, no rebound tenderness  gu- no clayton, pt voiding on his own, no cvat b/l                          12.0   13.15 )-----------( 229      ( 10 Apr 2018 07:00 )             36.2   04-10    142  |  103  |  17  ----------------------------<  108<H>  4.0   |  25  |  1.3    Ca    8.2<L>      10 Apr 2018 07:00  Mg     1.7     04-10    TPro  6.3  /  Alb  3.1<L>  /  TBili  0.8  /  DBili  0.2  /  AST  36  /  ALT  59<H>  /  AlkPhos  104  04-09   Culture - Urine (04.09.18 @ 01:20)    Specimen Source: .Urine Clean Catch (Midstream)    Culture Results:   No growth

## 2018-04-10 NOTE — DISCHARGE NOTE ADULT - PATIENT PORTAL LINK FT
You can access the [facility], offered by [facility], by registering with the following website: [email or web address]

## 2018-04-10 NOTE — PROGRESS NOTE ADULT - SUBJECTIVE AND OBJECTIVE BOX
ACE RYAN  62y, Male  Allergy: Allergy Status Unknown    Hospital Day: 1d    Patient seen and examined earlier today.     PMH/PSH:  PAST MEDICAL & SURGICAL HISTORY:  Coronary artery disease  Nephrolithiasis: s/p lithotripsy  Stented coronary artery: stent placement 13 years ago  Hyperlipemia  HTN (hypertension)  H/O heart artery stent  H/O Achilles tendon repair: Right side      LAST 24-Hr EVENTS:  Possible need for stent placement today by urology.     VITALS:  T(F): 98.5 (04-10-18 @ 07:34), Max: 98.7 (18 @ 15:56)  HR: 77 (04-10-18 @ 07:34)  BP: 148/72 (04-10-18 @ 07:34) (136/65 - 175/91)  RR: 18 (04-10-18 @ 07:34)  SpO2: 96% (04-10-18 @ 04:55)    TESTS & MEASUREMENTS:  Weight (Kg): 93.9 (18 @ 09:55)    18 @ 07:01  -  04-10-18 @ 07:00  --------------------------------------------------------  IN: 0 mL / OUT: 500 mL / NET: -500 mL                      12.0   13.15 )-----------( 229      ( 10 Apr 2018 07:00 )             36.2     PT/INR - ( 2018 22:10 )   PT: 11.80 sec;   INR: 1.09 ratio         PTT - ( 2018 22:10 )  PTT:27.3 sec  04-10    142  |  103  |  17  ----------------------------<  108<H>  4.0   |  25  |  1.3    Ca    8.2<L>      10 Apr 2018 07:00  Mg     1.7     04-10    TPro  6.3  /  Alb  3.1<L>  /  TBili  0.8  /  DBili  0.2  /  AST  36  /  ALT  59<H>  /  AlkPhos  104  04-09    LIVER FUNCTIONS - ( 2018 06:32 )  Alb: 3.1 g/dL / Pro: 6.3 g/dL / ALK PHOS: 104 U/L / ALT: 59 U/L / AST: 36 U/L / GGT: x             Culture - Urine (collected 18 @ 01:20)  Source: .Urine Clean Catch (Midstream)  Final Report (04-10-18 @ 08:46):    No growth      Urinalysis Basic - ( 2018 22:10 )    Color: Yellow / Appearance: Cloudy / S.020 / pH: x  Gluc: x / Ketone: Negative  / Bili: Negative / Urobili: 1.0 mg/dL   Blood: x / Protein: 30 mg/dL / Nitrite: Negative   Leuk Esterase: Moderate / RBC: 10-25 /HPF / WBC >50 /HPF   Sq Epi: x / Non Sq Epi: Few /HPF / Bacteria: Moderate /HPF      RADIOLOGY & ADDITIONAL TESTS:    < from: US Kidney and Bladder (18 @ 21:44) >  Bilateral ureteral jets are visualized.    No calculus visualized.         RECENT DIAGNOSTIC ORDERS:    Xray Kidney Ureter Bladder: Urgent   Indication: L UVJ stone  Transport: Portable  Exam Completed  Provider's Contact #: [phone] (18 @ 18:28)  Diet, NPO:   Except Medications (04-10-18 @ 05:21)      MEDICATIONS:    MEDICATIONS  (STANDING):  aspirin 325 milliGRAM(s) Oral daily  atorvastatin 20 milliGRAM(s) Oral at bedtime  cefTRIAXone   IVPB 2 Gram(s) IV Intermittent every 24 hours  heparin  Injectable 5000 Unit(s) SubCutaneous every 8 hours  influenza   Vaccine 0.5 milliLiter(s) IntraMuscular once  metoprolol succinate ER 50 milliGRAM(s) Oral daily  NIFEdipine XL 60 milliGRAM(s) Oral daily  NIFEdipine XL 60 milliGRAM(s) Oral daily  sodium chloride 0.9%. 1000 milliLiter(s) (100 mL/Hr) IV Continuous <Continuous>  tamsulosin 0.4 milliGRAM(s) Oral at bedtime    MEDICATIONS  (PRN):  acetaminophen   Tablet 650 milliGRAM(s) Oral every 6 hours PRN fever or mild pain  morphine  - Injectable 2 milliGRAM(s) IV Push every 4 hours PRN sevee pain  morphine  - Injectable 1 milliGRAM(s) IV Push every 6 hours PRN Moderate Pain (4 - 6)          PHYSICAL EXAM:  GENERAL: A&O x3,  in NAD/P,   NECK: No Swelling, No JVD.   CHEST/LUNG: Good air entry, No crackles, No rhonchi, No wheezing.  HEART: RRR, No murmurs.  ABDOMEN: Soft, Nontender, Nondistended; Bowel sounds present.  EXTREMITIES:  Peripheral Pulses Present , No clubbing, No edema

## 2018-04-10 NOTE — DISCHARGE NOTE ADULT - HOSPITAL COURSE
62yM w/ hxNephrolithiasis s/p lithotripsy, CAD s/p PCI and stent (13 years ago), HTN, DLD p/w worsening L sided pelvic pain, n/v, fever, chills X 4 days & inability to tolerate PO PTP. CTAbdPwIVCon showed mild left hydroureteronephrosis extending to an obstructing calculus at the UVJ measuring 4 x 4 x 3 mm. Patient's symptoms resolved with IV pain control, ceftriaxone, and IVF. No cystoscopy necessary. Patient medically stable for discharge with outpatient follow-up in 1-2 weeks.

## 2018-04-10 NOTE — DISCHARGE NOTE ADULT - PLAN OF CARE
Symptomatic treatment, medical management, and evaluation for surgical intervention You were found to have a left-sided nephrolithiasis and received supportive medical therapy with resolution of your symptoms. You were evaluated by the urology service and cleared for discharge. Take your medications as prescribed. Follow-up with your PMD and urologist in 1-2 weeks.

## 2018-04-10 NOTE — PROGRESS NOTE ADULT - ASSESSMENT
1.	Obstructive Uropathy/ Hydronephrosis/ Calculus s/p urology eval  2.	High risk for ascending UTI/Pyelo    Plan:  1. KUB: No opacity; patient likely passed stone  2. From urology POV, may d/c patient on Flomax and Abx until f/u with urology as outpatient  3. D/W RN and PGY1 at bedside
63 yo male with likely passed left uvj stone  pt afebrile, wbc and cr improving, ucx no growth (pt may have been given abx prior to ucx being obtained)    Plan  -d/c pt home on 5 day course of PO Ceftin  -strain urine  -op Urology f/u with

## 2018-04-10 NOTE — DISCHARGE NOTE ADULT - CARE PLAN
Principal Discharge DX:	Nephrolithiasis  Goal:	Symptomatic treatment, medical management, and evaluation for surgical intervention  Assessment and plan of treatment:	You were found to have a left-sided nephrolithiasis and received supportive medical therapy with resolution of your symptoms. You were evaluated by the urology service and cleared for discharge. Take your medications as prescribed. Follow-up with your PMD and urologist in 1-2 weeks.

## 2018-04-10 NOTE — DISCHARGE NOTE ADULT - MEDICATION SUMMARY - MEDICATIONS TO TAKE
I will START or STAY ON the medications listed below when I get home from the hospital:    aspirin 325 mg oral tablet  -- 1 tab(s) by mouth once a day  -- Indication: For Coronary artery disease    tamsulosin 0.4 mg oral capsule  -- 1 cap(s) by mouth once a day (at bedtime)  -- Indication: For Hydronephrosis with urinary obstruction due to ureteral calculus    Lipitor 20 mg oral tablet  -- 1 tab(s) by mouth once a day (at bedtime)  -- Indication: For Hyperlipemia    Toprol-XL 50 mg oral tablet, extended release  -- 1 tab(s) by mouth once a day  -- Indication: For HTN (hypertension)    Nifedical XL 60 mg oral tablet, extended release  -- 1 tab(s) by mouth once a day  -- Indication: For HTN (hypertension)    cefuroxime 500 mg oral tablet  -- 1 tab(s) by mouth 2 times a day   -- Finish all this medication unless otherwise directed by prescriber.  Medication should be taken with plenty of water.  Take with food or milk.    -- Indication: For RENAL COLIC ON LEFT SIDE;UTI(URINARY TRACT INFECTION);ACUTE KIDNEY INJURY

## 2018-04-10 NOTE — DISCHARGE NOTE ADULT - CARE PROVIDER_API CALL
Ton Dyer (MD; PhD), Cardiovascular Disease; Interventional Cardiology  [facility] [address] [place] [address]  [address] [place] [address]  Phone: [phone]  Fax: [phone]    Gala Tineo (MD), Urology  [address] [place] [address]  Phone: [phone]  Fax: [phone]

## 2018-04-12 DIAGNOSIS — E78.5 HYPERLIPIDEMIA, UNSPECIFIED: ICD-10-CM

## 2018-04-12 DIAGNOSIS — Z95.5 PRESENCE OF CORONARY ANGIOPLASTY IMPLANT AND GRAFT: ICD-10-CM

## 2018-04-12 DIAGNOSIS — N13.6 PYONEPHROSIS: ICD-10-CM

## 2018-04-12 DIAGNOSIS — Z87.442 PERSONAL HISTORY OF URINARY CALCULI: ICD-10-CM

## 2018-04-12 DIAGNOSIS — N17.9 ACUTE KIDNEY FAILURE, UNSPECIFIED: ICD-10-CM

## 2018-04-12 DIAGNOSIS — I10 ESSENTIAL (PRIMARY) HYPERTENSION: ICD-10-CM

## 2018-04-12 DIAGNOSIS — I25.10 ATHEROSCLEROTIC HEART DISEASE OF NATIVE CORONARY ARTERY WITHOUT ANGINA PECTORIS: ICD-10-CM

## 2018-04-14 LAB
CULTURE RESULTS: SIGNIFICANT CHANGE UP
SPECIMEN SOURCE: SIGNIFICANT CHANGE UP

## 2019-04-08 PROBLEM — E78.5 HYPERLIPIDEMIA, UNSPECIFIED: Chronic | Status: ACTIVE | Noted: 2018-04-08

## 2019-04-08 PROBLEM — Z95.5 PRESENCE OF CORONARY ANGIOPLASTY IMPLANT AND GRAFT: Chronic | Status: ACTIVE | Noted: 2018-04-09

## 2019-04-08 PROBLEM — I10 ESSENTIAL (PRIMARY) HYPERTENSION: Chronic | Status: ACTIVE | Noted: 2018-04-08

## 2019-04-08 PROBLEM — I25.10 ATHEROSCLEROTIC HEART DISEASE OF NATIVE CORONARY ARTERY WITHOUT ANGINA PECTORIS: Chronic | Status: ACTIVE | Noted: 2018-04-09

## 2019-04-08 PROBLEM — N20.0 CALCULUS OF KIDNEY: Chronic | Status: ACTIVE | Noted: 2018-04-09

## 2019-06-26 ENCOUNTER — APPOINTMENT (OUTPATIENT)
Dept: CARDIOLOGY | Facility: CLINIC | Age: 64
End: 2019-06-26

## 2020-08-29 ENCOUNTER — EMERGENCY (EMERGENCY)
Facility: HOSPITAL | Age: 65
LOS: 0 days | Discharge: HOME | End: 2020-08-29
Attending: STUDENT IN AN ORGANIZED HEALTH CARE EDUCATION/TRAINING PROGRAM | Admitting: STUDENT IN AN ORGANIZED HEALTH CARE EDUCATION/TRAINING PROGRAM
Payer: MEDICARE

## 2020-08-29 VITALS
DIASTOLIC BLOOD PRESSURE: 72 MMHG | RESPIRATION RATE: 18 BRPM | TEMPERATURE: 99 F | SYSTOLIC BLOOD PRESSURE: 137 MMHG | OXYGEN SATURATION: 97 % | HEART RATE: 83 BPM
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
## 2020-08-29 DIAGNOSIS — I10 ESSENTIAL (PRIMARY) HYPERTENSION: ICD-10-CM

## 2020-08-29 DIAGNOSIS — S92.501A DISPLACED UNSPECIFIED FRACTURE OF RIGHT LESSER TOE(S), INITIAL ENCOUNTER FOR CLOSED FRACTURE: ICD-10-CM

## 2020-08-29 DIAGNOSIS — Z98.890 OTHER SPECIFIED POSTPROCEDURAL STATES: Chronic | ICD-10-CM

## 2020-08-29 DIAGNOSIS — Z95.5 PRESENCE OF CORONARY ANGIOPLASTY IMPLANT AND GRAFT: Chronic | ICD-10-CM

## 2020-08-29 DIAGNOSIS — M79.676 PAIN IN UNSPECIFIED TOE(S): ICD-10-CM
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
## 2020-08-29 DIAGNOSIS — Z79.899 OTHER LONG TERM (CURRENT) DRUG THERAPY: ICD-10-CM
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
## 2020-08-29 PROCEDURE — 73630 X-RAY EXAM OF FOOT: CPT | Mod: 26,RT

## 2020-08-29 PROCEDURE — 28510 TREATMENT OF TOE FRACTURE: CPT | Mod: 54

## 2020-08-29 PROCEDURE — 99284 EMERGENCY DEPT VISIT MOD MDM: CPT | Mod: 57

## 2020-08-29 PROCEDURE — 29550 STRAPPING OF TOES: CPT | Mod: T5

## 2020-08-29 NOTE — ED PROVIDER NOTE - PHYSICAL EXAMINATION
VITAL SIGNS: I have reviewed nursing notes and confirm.  CONSTITUTIONAL: Well-developed; well-nourished; in no acute distress.  SKIN: Skin exam is warm and dry, no acute rash.  HEAD: Normocephalic; atraumatic.  EYES: PERRL, EOM intact; conjunctiva and sclera clear.  ENT: No nasal discharge; airway clear.   EXT: Normal ROM. No edema. +Tenderness to distal 3rd phalanx of right foot.   LYMPH: No acute cervical adenopathy.  NEURO: Alert, oriented. Grossly unremarkable. No focal deficits.  PSYCH: Cooperative, appropriate.

## 2020-08-29 NOTE — ED PROVIDER NOTE - NSFOLLOWUPINSTRUCTIONS_ED_ALL_ED_FT
Toe Fracture    A toe fracture is a break in one of the toe bones (phalanges). A toe fracture may be:  A crack in the surface of the bone (stress fracture). This often occurs in athletes.A break all the way through the bone (complete fracture).What are the causes?  This condition may be caused by:  Direct impact, such as from dropping a heavy object on your toe.Stubbing your toe.Twisting or stretching your toe out of place.Overuse or repetitive exercise.What increases the risk?  You are more likely to develop this condition if you:  Play contact sports.Have a condition that causes the bones to become thin and brittle (osteoporosis).Have a low calcium level.What are the signs or symptoms?  The main symptoms of this condition are swelling and pain in the toe. Other symptoms include:  Bruising.Stiffness.Numbness.A change in the way the toe looks.Broken bones that poke through the skin.Blood beneath the toenail.How is this diagnosed?  This condition is diagnosed with a physical exam. You may also have X-rays.  How is this treated?  Treatment for this condition depends on the type of fracture and its severity. Treatment may include:  Taping the broken toe to a toe that is next to it (tatiana taping). This is the most common treatment for fractures in which the bone has not moved out of place (non-displaced fracture).Wearing a shoe that has a wide, rigid sole to protect the toe and to limit its movement.Wearing a walking cast.Having a procedure to move the toe back into place.Surgery. This may be needed if the:  Pieces of broken bone are out of place (displaced).Bone breaks through the skin.Physical therapy. This is done to help regain movement and strength in the toe.You may need follow-up X-rays to make sure that the bone is healing well and staying in position.  Follow these instructions at home:  If you have a shoe:     Wear the shoe as told by your health care provider. Remove it only as told by your health care provider. Loosen the shoe if your toes tingle, become numb, or turn cold and blue.Keep the shoe clean and dry.If you have a cast:     Do not put pressure on any part of the cast until it is fully hardened. This may take several hours.Do not stick anything inside the cast to scratch your skin. Doing that increases your risk of infection.Check the skin around the cast every day. Tell your health care provider about any concerns. You may put lotion on dry skin around the edges of the cast. Do not put lotion on the skin underneath the cast. Keep the cast clean and dry.Bathing     Do not take baths, swim, or use a hot tub until your health care provider approves. Ask your health care provider if you can take showers.If the shoe or cast is not waterproof:  Do not let it get wet. Cover it with a watertight covering when you take a bath or a shower.Activity     Do not use the injured foot to support your body weight until your health care provider says that you can. Use crutches as directed.Ask your health care provider:  What activities are safe for you during recovery.What activities you need to avoid.Do physical therapy exercises as directed.Driving     Do not drive or use heavy machinery while taking pain medicine.Do not drive while wearing a cast on a foot that you use for driving.Managing pain, stiffness, and swelling        If directed, put ice on painful areas:  Put ice in a plastic bag.Place a towel between your skin and the bag.  If you have a shoe, remove it as told by your health care provider.If you have a cast, place a towel between your cast and the bag.Leave the ice on for 20 minutes, 2–3 times per day.Raise (elevate) the injured area above the level of your heart while you are sitting or lying down.General instructions     If your toe was treated with tatiana taping, follow your health care provider's instructions for changing the gauze and tape. Change it more often if:  The gauze and tape get wet. If this happens, dry the space between the toes.The gauze and tape are too tight and cause your toe to become pale or numb.If you were not given a protective shoe, wear sturdy, supportive shoes. Your shoes should not pinch your toes and should not fit tightly against your toes.Do not use any products that contain nicotine or tobacco, such as cigarettes and e-cigarettes. These can delay bone healing. If you need help quitting, ask your health care provider.Take over-the-counter and prescription medicines only as told by your health care provider.Keep all follow-up visits as told by your health care provider. This is important.Contact a health care provider if you have:  Pain that gets worse or does not get better with medicine.A fever.A bad smell coming from your cast.Get help right away if you have:  Any of the following in your toes or your foot:  Numbness that gets worse.Tingling.Coldness.Blue skin.Redness or swelling that gets worse.Pain that suddenly becomes severe.Summary  A toe fracture is a break in one of the toe bones (phalanges).Treatment depends on how severe your fracture is and how the pieces of the broken bone line up with each other. Treatment may include tatiana taping, wearing a shoe or a cast, or using crutches.Ice and elevate your foot to help lessen the pain and swelling.This information is not intended to replace advice given to you by your health care provider. Make sure you discuss any questions you have with your health care provider.

## 2020-08-29 NOTE — ED ADULT NURSE NOTE - PMH
Coronary artery disease    HTN (hypertension)    Hyperlipemia    Nephrolithiasis  s/p lithotripsy  Stented coronary artery  stent placement 13 years ago

## 2020-08-29 NOTE — ED PROVIDER NOTE - NSFOLLOWUPCLINICS_GEN_ALL_ED_FT
[facility]  Podiatry  .  [place]   Phone: [phone]  Fax:   Follow Up Time: 1-3 Days

## 2020-08-29 NOTE — ED PROVIDER NOTE - NS ED ROS FT
Review of Systems:  	•	CONSTITUTIONAL - no fever, no diaphoresis, no chills  	•	SKIN - no rash  	•	HEMATOLOGIC - no bleeding, no bruising  	•	MUSCULOSKELETAL - +toe pain, + swelling, no redness  	•	NEUROLOGIC - no weakness,no paresthesias  	All other ROS are negative except as documented in HPI.

## 2020-08-29 NOTE — ED PROVIDER NOTE - CARE PROVIDER_API CALL
Alex Villagran  Podiatric Medicine and Surgery  [address] [place] [id number]  Phone: [phone]  Fax: [phone]  Follow Up Time: 1-3 Days

## 2020-08-29 NOTE — ED ADULT NURSE NOTE - OBJECTIVE STATEMENT
Pt presents to ED after stubbing his R 3rd toe last night on a shopping cart. Pt is ambulatory and able to bear weight. Pt has pain and swelling to tip of toe, no laceration or open skin.

## 2020-08-29 NOTE — ED PROVIDER NOTE - OBJECTIVE STATEMENT
64 yo M with pmhx of HTN presenting with 3rd toe pain to right foot after stubbing foot on a shopping cart last night. Symptoms are mild. Able to bear weight. Reports minimal swelling. No paresthesias or weakness.

## 2020-08-29 NOTE — ED PROVIDER NOTE - ATTENDING CONTRIBUTION TO CARE
64 yo m hx htn here for R 3rd toe pain x last night. pt stubbed toe on shopping cart. pt ambulatory and able to bear weight. pt w/ pain and swelling to tip. no lacerations.    vss  gen- NAD, aaox3  card-rrr  lungs-ctab, no wheezing or rhonchi  R foot- mild swelling/tenderness to distal 3rd digit, no tenderness to 1st or 5th digit, no med/lateral maleolar tenderness    xr r/o fx, conservative care w/ tatiana tape, splint, f/u

## 2021-05-06 ENCOUNTER — RESULT CHARGE (OUTPATIENT)
Age: 66
End: 2021-05-06

## 2021-05-06 ENCOUNTER — APPOINTMENT (OUTPATIENT)
Dept: CARDIOLOGY | Facility: CLINIC | Age: 66
End: 2021-05-06
Payer: MEDICARE

## 2021-05-06 VITALS
BODY MASS INDEX: 34.69 KG/M2 | WEIGHT: 221 LBS | DIASTOLIC BLOOD PRESSURE: 80 MMHG | SYSTOLIC BLOOD PRESSURE: 140 MMHG | HEIGHT: 67 IN

## 2021-05-06 VITALS — DIASTOLIC BLOOD PRESSURE: 80 MMHG | SYSTOLIC BLOOD PRESSURE: 132 MMHG

## 2021-05-06 DIAGNOSIS — Z01.810 ENCOUNTER FOR PREPROCEDURAL CARDIOVASCULAR EXAMINATION: ICD-10-CM

## 2021-05-06 PROCEDURE — 93015 CV STRESS TEST SUPVJ I&R: CPT

## 2021-05-06 PROCEDURE — 99214 OFFICE O/P EST MOD 30 MIN: CPT | Mod: 25

## 2021-05-06 RX ORDER — NIFEDIPINE 90 MG/1
90 TABLET, EXTENDED RELEASE ORAL DAILY
Refills: 0 | Status: ACTIVE | COMMUNITY

## 2021-05-06 RX ORDER — METOPROLOL SUCCINATE 50 MG/1
50 TABLET, EXTENDED RELEASE ORAL TWICE DAILY
Refills: 0 | Status: ACTIVE | COMMUNITY

## 2021-05-06 NOTE — HISTORY OF PRESENT ILLNESS
[FreeTextEntry1] : 66-yo male who presented to [facility] with exertional chest pain > 10 years ago, 1 coronary stent placed with complete relief of symptoms. Patient denies CP, SOB now, however, he drives everywhere, has not taken a long walk in several months.\par

## 2021-05-06 NOTE — PHYSICAL EXAM
[Well Developed] : well developed [Well Nourished] : well nourished [No Acute Distress] : no acute distress [Normal Conjunctiva] : normal conjunctiva [Normal Venous Pressure] : normal venous pressure [No Carotid Bruit] : no carotid bruit [Normal S1, S2] : normal S1, S2 [No Murmur] : no murmur [No Rub] : no rub [S4] : S4 [No Respiratory Distress] : no respiratory distress  [Soft] : abdomen soft [Non Tender] : non-tender [Normal Bowel Sounds] : normal bowel sounds [Normal Gait] : normal gait [No Edema] : no edema [No Cyanosis] : no cyanosis [No Clubbing] : no clubbing [No Varicosities] : no varicosities [No Rash] : no rash [Moves all extremities] : moves all extremities [No Focal Deficits] : no focal deficits [Normal Speech] : normal speech [Alert and Oriented] : alert and oriented [Normal memory] : normal memory [de-identified] : Bronchial breath sounds and mild wheezing bilaterally

## 2021-05-06 NOTE — REVIEW OF SYSTEMS
[Negative] : Neurological [Rash] : no rash [Anxiety] : no anxiety [Easy Bleeding] : no tendency for easy bleeding [Easy Bruising] : no tendency for easy bruising

## 2021-05-06 NOTE — ASSESSMENT
[FreeTextEntry1] : 66-yo male with h/o CAD.\par HTN.\par Hyperlipidemia.\par Obesity.\par Postinfectious bronchitis (after flu 2-3 weeks ago).\par Exercise capacity 7 METs, no evidence of ischemia.\par Low risk for cardiovascular complications of umbilical hernia repair.\par \par Recommend:\par Continue treatment. Add ASA 81 mg daily.\par Diet, weight loss discussed.\par Patient's bronchitis may need to be treated prior to elective surgery (he will f/u with PMD).\par No additional cardiac evaluation at this time.\par \par Yevgeniy Grijalva MD\par

## 2021-05-06 NOTE — CARDIOLOGY SUMMARY
[de-identified] : 05/06/21:\par SR 82/min, borderline LVH. [de-identified] : 05/06/21:\par EST: 7 METs, limited by dyspnea, no ischemia. [de-identified] : Stress ECHO 03/27/2019:\par 10 METs, no ischemia\par LVEF 69%\par Mild TR.

## 2021-05-27 NOTE — ED ADULT NURSE NOTE - ISOLATION TYPE:
Patient requested for RN to contact MD regarding moving neb times and her edematous feet, saying that they are painful. She also complained of her congestion. Notified MD, no new orders and lasix will not be increased due to hypotensive episodes. She requested this writer change her wound dressings on her bilateral heels in order to fully observe the edema. This writer completed this task and the edema is no different than this writer visualizing them without changing the wound dressings, and this writer already discussed this with the doctor. This was told to the patient and she asked to speak with the charge nurse.    None

## 2021-06-30 NOTE — ED PROVIDER NOTE - PATIENT PORTAL LINK FT
No You can access the FollowMyHealth Patient Portal offered by [facility] by registering at the following website: [email or web address]. By joining [email or web address] FollowMyHealth portal, you will also be able to view your health information using other applications (apps) compatible with our system.

## 2021-11-18 ENCOUNTER — APPOINTMENT (OUTPATIENT)
Dept: CARDIOLOGY | Facility: CLINIC | Age: 66
End: 2021-11-18

## 2021-12-07 ENCOUNTER — APPOINTMENT (OUTPATIENT)
Dept: CARDIOLOGY | Facility: CLINIC | Age: 66
End: 2021-12-07
Payer: MEDICARE

## 2021-12-07 VITALS
DIASTOLIC BLOOD PRESSURE: 70 MMHG | WEIGHT: 230 LBS | HEIGHT: 67 IN | SYSTOLIC BLOOD PRESSURE: 124 MMHG | BODY MASS INDEX: 36.1 KG/M2

## 2021-12-07 PROCEDURE — 93000 ELECTROCARDIOGRAM COMPLETE: CPT

## 2021-12-07 PROCEDURE — 99213 OFFICE O/P EST LOW 20 MIN: CPT

## 2021-12-07 RX ORDER — ASPIRIN ENTERIC COATED TABLETS 81 MG 81 MG/1
81 TABLET, DELAYED RELEASE ORAL DAILY
Qty: 90 | Refills: 1 | Status: ACTIVE | COMMUNITY
Start: 2021-12-07

## 2021-12-07 NOTE — ASSESSMENT
[FreeTextEntry1] : 66-yo male with h/o CAD. S/p PCI.\par HTN.\par Hyperlipidemia.\par Obesity.\par \par \par Recommend:\par Continue treatment. \par Diet, weight loss discussed again.\par Repeat blood work prior to last visit.\par F/u in 6 months.,\par \par Yevgeniy Grijalva MD\par

## 2021-12-07 NOTE — HISTORY OF PRESENT ILLNESS
[FreeTextEntry1] : 66-yo male who presented to [facility] with exertional chest pain > 10 years ago, 1 coronary stent placed with complete relief of symptoms. \par \par Patient denies CP, SOB now. Hernia repair uncomplicated.

## 2021-12-07 NOTE — PHYSICAL EXAM
[Well Developed] : well developed [No Acute Distress] : no acute distress [Normal Conjunctiva] : normal conjunctiva [Normal Venous Pressure] : normal venous pressure [No Carotid Bruit] : no carotid bruit [Normal S1, S2] : normal S1, S2 [No Murmur] : no murmur [No Rub] : no rub [S4] : S4 [No Respiratory Distress] : no respiratory distress  [Soft] : abdomen soft [Non Tender] : non-tender [Normal Bowel Sounds] : normal bowel sounds [Normal Gait] : normal gait [No Edema] : no edema [No Cyanosis] : no cyanosis [No Clubbing] : no clubbing [No Varicosities] : no varicosities [No Rash] : no rash [Moves all extremities] : moves all extremities [No Focal Deficits] : no focal deficits [Normal Speech] : normal speech [Alert and Oriented] : alert and oriented [Normal memory] : normal memory [Obese] : obese [Normal PT B/L] : normal PT B/L [de-identified] : Bronchial breath sounds and mild wheezing bilaterally

## 2021-12-07 NOTE — CARDIOLOGY SUMMARY
[de-identified] : 12/07/21:\par SR 83/min, NSST changes. [de-identified] : 05/06/21:\par EST: 7 METs, limited by dyspnea, no ischemia. [de-identified] : Stress ECHO 03/27/2019:\par 10 METs, no ischemia\par LVEF 69%\par Mild TR.

## 2021-12-08 ENCOUNTER — RESULT CHARGE (OUTPATIENT)
Age: 66
End: 2021-12-08

## 2022-10-25 ENCOUNTER — APPOINTMENT (OUTPATIENT)
Dept: CARDIOLOGY | Facility: CLINIC | Age: 67
End: 2022-10-25

## 2022-10-25 VITALS
HEART RATE: 90 BPM | DIASTOLIC BLOOD PRESSURE: 72 MMHG | SYSTOLIC BLOOD PRESSURE: 126 MMHG | HEIGHT: 67 IN | WEIGHT: 230 LBS | BODY MASS INDEX: 36.1 KG/M2

## 2022-10-25 DIAGNOSIS — E78.1 PURE HYPERGLYCERIDEMIA: ICD-10-CM

## 2022-10-25 DIAGNOSIS — I25.9 CHRONIC ISCHEMIC HEART DISEASE, UNSPECIFIED: ICD-10-CM

## 2022-10-25 PROCEDURE — 99214 OFFICE O/P EST MOD 30 MIN: CPT | Mod: 25

## 2022-10-25 PROCEDURE — 93000 ELECTROCARDIOGRAM COMPLETE: CPT

## 2022-10-25 RX ORDER — HYDROCHLOROTHIAZIDE 12.5 MG/1
12.5 TABLET ORAL
Qty: 90 | Refills: 0 | Status: ACTIVE | COMMUNITY
Start: 2022-08-17

## 2022-10-25 RX ORDER — SILDENAFIL 20 MG/1
20 TABLET ORAL
Qty: 60 | Refills: 0 | Status: ACTIVE | COMMUNITY
Start: 2021-01-18

## 2022-10-25 RX ORDER — METFORMIN HYDROCHLORIDE 500 MG/1
500 TABLET, COATED ORAL
Qty: 60 | Refills: 2 | Status: ACTIVE | COMMUNITY
Start: 2022-10-25 | End: 1900-01-01

## 2022-10-25 RX ORDER — ALLOPURINOL 100 MG/1
100 TABLET ORAL
Qty: 59 | Refills: 0 | Status: ACTIVE | COMMUNITY
Start: 2022-06-16

## 2022-10-25 NOTE — ASSESSMENT
[FreeTextEntry1] : 66-yo male with h/o CAD. S/p PCI.\par HTN.\par Severe hyperlipidemia.\par Obesity.\par New-onset A-fib.\par \par \par Recommend:\par Continue treatment. \par Add Fenofibrate and Vascepa to the regimen\par Start Metformin 500 mg PO BID, patient will F/U with PCP in 3 weeks\par Diet, weight loss discussed again.\par Repeat blood work prior to last visit.\par F/u in 2 months.,\par \par Yevgeniy Grijalva MD\par

## 2022-10-25 NOTE — REVIEW OF SYSTEMS
[Negative] : Neurological [Blurry Vision] : no blurred vision [Rash] : no rash [Anxiety] : no anxiety [Easy Bleeding] : no tendency for easy bleeding [Easy Bruising] : no tendency for easy bruising

## 2022-10-25 NOTE — CARDIOLOGY SUMMARY
[de-identified] : 10/25/2022: SR, Normal ECG [de-identified] : 05/06/21:\par EST: 7 METs, limited by dyspnea, no ischemia. [de-identified] : Stress ECHO 03/27/2019:\par 10 METs, no ischemia\par LVEF 69%\par Mild TR.

## 2022-10-25 NOTE — HISTORY OF PRESENT ILLNESS
[FreeTextEntry1] : 67-yo male who presented to [facility] with exertional chest pain > 10 years ago, 1 coronary stent placed with complete relief of symptoms.\par \par 10/25/2022: Patient presents for F/U visit. He denies chest pain, SOB, palpitations, dizziness. He has tried to loose weight. His recent blood work was significant for Triglycerides of >600 and HgA1C 7.7, patient was never diagnosed with diabetes. Follows with nephrology.

## 2022-10-26 ENCOUNTER — RESULT CHARGE (OUTPATIENT)
Age: 67
End: 2022-10-26

## 2022-11-15 ENCOUNTER — APPOINTMENT (OUTPATIENT)
Dept: CARDIOLOGY | Facility: CLINIC | Age: 67
End: 2022-11-15

## 2022-11-15 PROCEDURE — 93880 EXTRACRANIAL BILAT STUDY: CPT

## 2022-11-22 ENCOUNTER — APPOINTMENT (OUTPATIENT)
Dept: CARDIOLOGY | Facility: CLINIC | Age: 67
End: 2022-11-22
Payer: MEDICARE

## 2022-11-22 PROCEDURE — 93306 TTE W/DOPPLER COMPLETE: CPT

## 2023-01-17 ENCOUNTER — RESULT CHARGE (OUTPATIENT)
Age: 68
End: 2023-01-17

## 2023-01-17 ENCOUNTER — APPOINTMENT (OUTPATIENT)
Dept: CARDIOLOGY | Facility: CLINIC | Age: 68
End: 2023-01-17
Payer: MEDICARE

## 2023-01-17 VITALS
HEIGHT: 67 IN | SYSTOLIC BLOOD PRESSURE: 124 MMHG | HEART RATE: 89 BPM | BODY MASS INDEX: 35.79 KG/M2 | DIASTOLIC BLOOD PRESSURE: 68 MMHG | WEIGHT: 228 LBS

## 2023-01-17 PROCEDURE — 99213 OFFICE O/P EST LOW 20 MIN: CPT

## 2023-01-17 PROCEDURE — 93000 ELECTROCARDIOGRAM COMPLETE: CPT

## 2023-01-17 RX ORDER — ICOSAPENT ETHYL 1 G/1
1 CAPSULE ORAL TWICE DAILY
Qty: 120 | Refills: 3 | Status: DISCONTINUED | COMMUNITY
Start: 2022-10-25 | End: 2023-01-17

## 2023-01-17 NOTE — HISTORY OF PRESENT ILLNESS
[FreeTextEntry1] : 67-yo male who presented to [facility] with exertional chest pain > 10 years ago, 1 coronary stent placed, HLD, DM, HTN\par \par Patient presents for a F/U visit. He denies chest pain, SOB, palpitations, dizziness. He has been trying to lose weight. \par \par GFR 56\par Trig 421\par A1c 7.9\par He was started on medications at last visit.

## 2023-01-17 NOTE — CARDIOLOGY SUMMARY
[de-identified] : 01/17/23: SR, normal ECG. [de-identified] : 05/06/21:\par EST: 7 METs, limited by dyspnea, no ischemia. [de-identified] : 11/22/22:\par LVEF 60%, normal diastolic function.\par \par Stress ECHO 03/27/2019:\par 10 METs, no ischemia\par LVEF 69%\par Mild TR.

## 2023-01-17 NOTE — PHYSICAL EXAM
[Normal] : well developed, well nourished, no acute distress [Well Developed] : well developed [No Acute Distress] : no acute distress [Obese] : obese [Normal Conjunctiva] : normal conjunctiva [Normal Venous Pressure] : normal venous pressure [No Carotid Bruit] : no carotid bruit [Normal S1, S2] : normal S1, S2 [No Murmur] : no murmur [No Rub] : no rub [S4] : S4 [Clear Lung Fields] : clear lung fields [Good Air Entry] : good air entry [No Respiratory Distress] : no respiratory distress  [Soft] : abdomen soft [Non Tender] : non-tender [Normal Bowel Sounds] : normal bowel sounds [Normal Gait] : normal gait [No Edema] : no edema [No Cyanosis] : no cyanosis [No Clubbing] : no clubbing [No Varicosities] : no varicosities [Normal PT B/L] : normal PT B/L [No Rash] : no rash [Moves all extremities] : moves all extremities [No Focal Deficits] : no focal deficits [Normal Speech] : normal speech [Alert and Oriented] : alert and oriented [Normal memory] : normal memory

## 2023-01-17 NOTE — ASSESSMENT
[FreeTextEntry1] : 66-yo male with h/o CAD. S/p PCI.\par HTN.\par Severe hyperlipidemia.\par Obesity.\par \par \par \par Recommend:\par Continue treatment. \par Increase Metformin to bid as recommended.\par Diet, weight loss discussed again.\par Repeat blood work scheduled. Patient will f/u with PCP after that.\par F/u in 6 months\par \par Yevgeniy Grijalva MD\par

## 2023-02-09 ENCOUNTER — APPOINTMENT (OUTPATIENT)
Dept: CARDIOLOGY | Facility: CLINIC | Age: 68
End: 2023-02-09
Payer: MEDICARE

## 2023-02-09 DIAGNOSIS — Z00.00 ENCOUNTER FOR GENERAL ADULT MEDICAL EXAMINATION W/OUT ABNORMAL FINDINGS: ICD-10-CM

## 2023-02-09 PROCEDURE — 99441: CPT

## 2023-02-10 PROBLEM — Z00.00 ENCOUNTER FOR PREVENTIVE HEALTH EXAMINATION: Status: ACTIVE | Noted: 2018-04-11

## 2023-06-13 ENCOUNTER — APPOINTMENT (OUTPATIENT)
Dept: CARDIOLOGY | Facility: CLINIC | Age: 68
End: 2023-06-13
Payer: MEDICARE

## 2023-06-13 VITALS
WEIGHT: 227 LBS | SYSTOLIC BLOOD PRESSURE: 115 MMHG | DIASTOLIC BLOOD PRESSURE: 75 MMHG | BODY MASS INDEX: 35.63 KG/M2 | HEART RATE: 80 BPM | HEIGHT: 67 IN

## 2023-06-13 PROCEDURE — 99213 OFFICE O/P EST LOW 20 MIN: CPT

## 2023-06-13 PROCEDURE — 93000 ELECTROCARDIOGRAM COMPLETE: CPT

## 2023-06-13 RX ORDER — ATORVASTATIN CALCIUM 20 MG/1
20 TABLET, FILM COATED ORAL
Qty: 90 | Refills: 3 | Status: DISCONTINUED | COMMUNITY
Start: 1900-01-01 | End: 2023-06-13

## 2023-06-13 NOTE — HISTORY OF PRESENT ILLNESS
[FreeTextEntry1] : 68-yo male who presented to [facility] with exertional chest pain > 10 years ago, 1 coronary stent placed, HLD, DM, HTN\par \par Patient presents for a follow up visit. He denies chest pain. Gets SOB only when he walks uphill. \par \par GFR 48\par Trig 301\par \par A1c 8.0\par

## 2023-06-13 NOTE — CARDIOLOGY SUMMARY
[de-identified] : 6/13/23:\par SR, normal ECG. [de-identified] : 05/06/21:\par EST: 7 METs, limited by dyspnea, no ischemia. [de-identified] : 11/22/22:\par LVEF 60%, normal diastolic function.\par \par Stress ECHO 03/27/2019:\par 10 METs, no ischemia\par LVEF 69%\par Mild TR.

## 2023-06-13 NOTE — ASSESSMENT
[FreeTextEntry1] : 68-yo male with h/o CAD. S/p PCI.\par HTN.\par Severe hyperlipidemia, still uncontrolled.\par Obesity.\par \par Recommend:\par Replace Atorvastatin with Rosuvastatin 20 mg daily.\par Diet, weight loss discussed again.\par F/u in 4 months.\par \par Yevgeniy Grijalva MD\par  [Nl] : Integumentary

## 2023-12-04 ENCOUNTER — RX RENEWAL (OUTPATIENT)
Age: 68
End: 2023-12-04

## 2023-12-26 ENCOUNTER — APPOINTMENT (OUTPATIENT)
Dept: CARDIOLOGY | Facility: CLINIC | Age: 68
End: 2023-12-26
Payer: MEDICARE

## 2023-12-26 VITALS
HEART RATE: 81 BPM | WEIGHT: 230 LBS | DIASTOLIC BLOOD PRESSURE: 80 MMHG | BODY MASS INDEX: 36.1 KG/M2 | SYSTOLIC BLOOD PRESSURE: 122 MMHG | HEIGHT: 67 IN

## 2023-12-26 DIAGNOSIS — E11.9 TYPE 2 DIABETES MELLITUS W/OUT COMPLICATIONS: ICD-10-CM

## 2023-12-26 DIAGNOSIS — R25.2 CRAMP AND SPASM: ICD-10-CM

## 2023-12-26 PROCEDURE — 93000 ELECTROCARDIOGRAM COMPLETE: CPT

## 2023-12-26 PROCEDURE — 99214 OFFICE O/P EST MOD 30 MIN: CPT

## 2023-12-26 RX ORDER — FENOFIBRATE 48 MG/1
48 TABLET ORAL DAILY
Qty: 90 | Refills: 1 | Status: DISCONTINUED | COMMUNITY
Start: 2022-10-25 | End: 2023-12-26

## 2023-12-26 RX ORDER — FENOFIBRATE 145 MG/1
145 TABLET, COATED ORAL
Qty: 90 | Refills: 0 | Status: ACTIVE | COMMUNITY
Start: 2023-11-28

## 2023-12-26 RX ORDER — LOSARTAN POTASSIUM 50 MG/1
50 TABLET, FILM COATED ORAL
Qty: 52 | Refills: 0 | Status: ACTIVE | COMMUNITY
Start: 2023-12-23

## 2023-12-26 NOTE — CARDIOLOGY SUMMARY
[de-identified] : 12/26/23: SR, normal ECG. [de-identified] : 05/06/21: EST: 7 METs, limited by dyspnea, no ischemia. [de-identified] : 11/22/22: LVEF 60%, normal diastolic function.  Stress ECHO 03/27/2019: 10 METs, no ischemia LVEF 69% Mild TR.

## 2023-12-26 NOTE — HISTORY OF PRESENT ILLNESS
[FreeTextEntry1] : 68-yo male who presented to [facility] with exertional chest pain > 10 years ago, 1 coronary stent placed, HLD, DM, HTN  Patient presents for a follow up visit. Had one episode of substernal chest discomfort relieved with belching. He admits to eating a lot of fatty food and carbs. He is c/o leg cramps when he lies down, relieved with standing up.   GFR 46 Trig 448 A1c 9.0 Was referred to endocrinologist.

## 2023-12-26 NOTE — ASSESSMENT
[FreeTextEntry1] : 68-yo male with h/o CAD. S/p PCI. HTN. Obesity. Severe hypertriglyceridemia  Uncontrolled DM Leg cramps, diminished pulses.  Recommend: Continue Rosuvastatin 20 mg daily. Diet, weight loss discussed again. Endo evaluation, importance discussed. PVR of the LE. Repeat BW with direct LDL. F/u in 4 months.  Yevgeniy Grijalva MD

## 2024-01-05 ENCOUNTER — APPOINTMENT (OUTPATIENT)
Dept: CARDIOLOGY | Facility: CLINIC | Age: 69
End: 2024-01-05

## 2024-01-31 ENCOUNTER — APPOINTMENT (OUTPATIENT)
Dept: CARDIOLOGY | Facility: CLINIC | Age: 69
End: 2024-01-31
Payer: MEDICARE

## 2024-01-31 PROCEDURE — 93923 UPR/LXTR ART STDY 3+ LVLS: CPT

## 2024-03-26 ENCOUNTER — APPOINTMENT (OUTPATIENT)
Dept: CARDIOLOGY | Facility: CLINIC | Age: 69
End: 2024-03-26
Payer: MEDICARE

## 2024-03-26 VITALS
BODY MASS INDEX: 33.74 KG/M2 | WEIGHT: 215 LBS | HEIGHT: 67 IN | HEART RATE: 80 BPM | SYSTOLIC BLOOD PRESSURE: 118 MMHG | DIASTOLIC BLOOD PRESSURE: 62 MMHG

## 2024-03-26 DIAGNOSIS — Z98.61 CORONARY ANGIOPLASTY STATUS: ICD-10-CM

## 2024-03-26 DIAGNOSIS — I25.10 ATHEROSCLEROTIC HEART DISEASE OF NATIVE CORONARY ARTERY W/OUT ANGINA PECTORIS: ICD-10-CM

## 2024-03-26 DIAGNOSIS — I10 ESSENTIAL (PRIMARY) HYPERTENSION: ICD-10-CM

## 2024-03-26 DIAGNOSIS — E78.5 HYPERLIPIDEMIA, UNSPECIFIED: ICD-10-CM

## 2024-03-26 PROCEDURE — 99214 OFFICE O/P EST MOD 30 MIN: CPT | Mod: 25

## 2024-03-26 PROCEDURE — 93000 ELECTROCARDIOGRAM COMPLETE: CPT

## 2024-03-26 RX ORDER — METFORMIN ER 500 MG 500 MG/1
500 TABLET ORAL
Qty: 77 | Refills: 0 | Status: DISCONTINUED | COMMUNITY
Start: 2023-11-14 | End: 2024-03-26

## 2024-03-26 RX ORDER — DAPAGLIFLOZIN 5 MG/1
5 TABLET, FILM COATED ORAL DAILY
Refills: 0 | Status: ACTIVE | COMMUNITY

## 2024-03-26 RX ORDER — ICOSAPENT ETHYL 1000 MG/1
1 CAPSULE ORAL
Refills: 0 | Status: ACTIVE | COMMUNITY

## 2024-03-26 NOTE — PHYSICAL EXAM
[Normal] : well developed, well nourished, no acute distress [Well Developed] : well developed [No Acute Distress] : no acute distress [Obese] : obese [Normal Conjunctiva] : normal conjunctiva [Normal Venous Pressure] : normal venous pressure [No Carotid Bruit] : no carotid bruit [Normal S1, S2] : normal S1, S2 [No Rub] : no rub [No Murmur] : no murmur [Clear Lung Fields] : clear lung fields [S4] : S4 [Good Air Entry] : good air entry [No Respiratory Distress] : no respiratory distress  [Soft] : abdomen soft [Non Tender] : non-tender [Normal Bowel Sounds] : normal bowel sounds [Normal Gait] : normal gait [No Edema] : no edema [No Cyanosis] : no cyanosis [No Clubbing] : no clubbing [No Varicosities] : no varicosities [Normal PT B/L] : normal PT B/L [No Rash] : no rash [Moves all extremities] : moves all extremities [Normal Speech] : normal speech [No Focal Deficits] : no focal deficits [Alert and Oriented] : alert and oriented [Normal memory] : normal memory

## 2024-06-14 RX ORDER — ROSUVASTATIN CALCIUM 20 MG/1
20 TABLET, FILM COATED ORAL
Qty: 90 | Refills: 1 | Status: ACTIVE | COMMUNITY
Start: 2023-06-13 | End: 1900-01-01

## 2024-07-10 ENCOUNTER — APPOINTMENT (OUTPATIENT)
Dept: PULMONOLOGY | Facility: CLINIC | Age: 69
End: 2024-07-10
Payer: MEDICARE

## 2024-07-10 VITALS
DIASTOLIC BLOOD PRESSURE: 62 MMHG | HEIGHT: 67 IN | WEIGHT: 208 LBS | HEART RATE: 72 BPM | RESPIRATION RATE: 14 BRPM | BODY MASS INDEX: 32.65 KG/M2 | OXYGEN SATURATION: 97 % | SYSTOLIC BLOOD PRESSURE: 130 MMHG

## 2024-07-10 DIAGNOSIS — R29.818 OTHER SYMPTOMS AND SIGNS INVOLVING THE NERVOUS SYSTEM: ICD-10-CM

## 2024-07-10 PROCEDURE — 99203 OFFICE O/P NEW LOW 30 MIN: CPT

## 2024-12-13 ENCOUNTER — RX RENEWAL (OUTPATIENT)
Age: 69
End: 2024-12-13

## 2025-04-08 ENCOUNTER — APPOINTMENT (OUTPATIENT)
Dept: CARDIOLOGY | Facility: CLINIC | Age: 70
End: 2025-04-08
Payer: MEDICARE

## 2025-04-08 ENCOUNTER — NON-APPOINTMENT (OUTPATIENT)
Age: 70
End: 2025-04-08

## 2025-04-08 VITALS
HEIGHT: 67 IN | WEIGHT: 193 LBS | SYSTOLIC BLOOD PRESSURE: 94 MMHG | HEART RATE: 87 BPM | DIASTOLIC BLOOD PRESSURE: 60 MMHG | BODY MASS INDEX: 30.29 KG/M2

## 2025-04-08 DIAGNOSIS — I25.10 ATHEROSCLEROTIC HEART DISEASE OF NATIVE CORONARY ARTERY W/OUT ANGINA PECTORIS: ICD-10-CM

## 2025-04-08 DIAGNOSIS — I10 ESSENTIAL (PRIMARY) HYPERTENSION: ICD-10-CM

## 2025-04-08 DIAGNOSIS — E11.9 TYPE 2 DIABETES MELLITUS W/OUT COMPLICATIONS: ICD-10-CM

## 2025-04-08 DIAGNOSIS — E78.5 HYPERLIPIDEMIA, UNSPECIFIED: ICD-10-CM

## 2025-04-08 DIAGNOSIS — Z98.61 CORONARY ANGIOPLASTY STATUS: ICD-10-CM

## 2025-04-08 PROCEDURE — 99214 OFFICE O/P EST MOD 30 MIN: CPT | Mod: 25

## 2025-04-08 PROCEDURE — 93000 ELECTROCARDIOGRAM COMPLETE: CPT

## 2025-04-08 RX ORDER — SEMAGLUTIDE 1.34 MG/ML
4 INJECTION, SOLUTION SUBCUTANEOUS
Refills: 0 | Status: ACTIVE | COMMUNITY